# Patient Record
Sex: FEMALE | Race: WHITE | Employment: PART TIME | URBAN - METROPOLITAN AREA
[De-identification: names, ages, dates, MRNs, and addresses within clinical notes are randomized per-mention and may not be internally consistent; named-entity substitution may affect disease eponyms.]

---

## 2017-12-29 ENCOUNTER — HOSPITAL ENCOUNTER (EMERGENCY)
Age: 52
Discharge: LEFT BEFORE DISCHARGE | End: 2017-12-29
Attending: EMERGENCY MEDICINE
Payer: SUBSIDIZED

## 2017-12-29 VITALS
RESPIRATION RATE: 19 BRPM | SYSTOLIC BLOOD PRESSURE: 147 MMHG | HEART RATE: 88 BPM | TEMPERATURE: 98.3 F | OXYGEN SATURATION: 99 % | DIASTOLIC BLOOD PRESSURE: 91 MMHG

## 2017-12-29 PROCEDURE — 75810000275 HC EMERGENCY DEPT VISIT NO LEVEL OF CARE

## 2017-12-30 NOTE — ED NOTES
Pt advised staff member that she declines all services here and left the ED on her own. Elpidio Purdy NP advised.

## 2017-12-30 NOTE — ED TRIAGE NOTES
Patient states that she is concerned for her anonymity and does not want anybody to release her name or reason for visit to the media. Advised patient of her right to privacy. Patient demonstrates pressured speech and has difficulty answering questions as to why she came to ED this evening.

## 2020-03-17 ENCOUNTER — TELEPHONE (OUTPATIENT)
Dept: HEMATOLOGY | Age: 55
End: 2020-03-17

## 2020-04-21 ENCOUNTER — VIRTUAL VISIT (OUTPATIENT)
Dept: HEMATOLOGY | Age: 55
End: 2020-04-21

## 2020-04-21 DIAGNOSIS — B18.2 CHRONIC HEPATITIS C WITHOUT HEPATIC COMA (HCC): Primary | ICD-10-CM

## 2020-04-21 PROBLEM — J30.89 ENVIRONMENTAL AND SEASONAL ALLERGIES: Status: ACTIVE | Noted: 2020-04-21

## 2020-04-21 NOTE — PROGRESS NOTES
[address] [place] [address], Aracelis TRIVEDI Dian Bar, MD Nile Orn, PA-C Bridgett Ruffing [facility]     April S Billie, [facility]   Quinton Black RADHA-ALEX Hdz [facility]       Danni Maldonado [facility] 136    at [address], [facility] Edita Larry  22.    [phone]    FAX: [address] [place] [address], [address]    [phone]    FAX: [phone]     Patient Care Team:  Jade Stephens MD as PCP - General (Internal Medicine)    Patient Active Problem List   Diagnosis Code    [facility] ([facility] B18.2    Environmental and seasonal allergies J30.89     VIRTUAL TELEHEALTH VISIT PERFORMED DUE TO COVID-19 EPIDEMIC    CONSENT:  Jordan Taylor, who was seen by synchronous, real-time, audio-video technology, and/or her healthcare decision maker, is aware this patient-initiated, TeleHealth encounter on 4/21/2020 is a billable service, with coverage as determined by her insurance carrier. she is aware she may receive a bill and has provided verbal consent to proceed. This patient was evaluated during a Virtual TeleHealth visit. A caregiver was present if appropriate. Due to this being a TeleHealth encounter performed during the DNOQ-44 public health emergency, the physical examination was limited to that listed in the [address] [facility].    The clinician listed above had asked me to see Jordan Taylor in consultation regarding chronic HCV and its management. No medical records were available for review when the patient was here for the appointment. The patient is a 54 y.o.   female who was found to have abnormalities in liver chemistries and subsequently tested positive for chronic HCV remotely. Risk factors for acquiring HCV are IV drug use and tattoos remotely. There was no history of acute icteric hepatitis at the time of these risk factors. Imaging of the liver was either not performed or the results are not available to me. An assessment of liver fibrosis with biopsy or elastography has not been performed. The patient was treated with standard interferon and then peginterferon with ribavirin. The patient was treated for 6 weeks. The patient tolerated treatment poorly and had to prematurely discontinue therapy. HCV RNA levels obtained during treatment are not available at this time. The patient is unaware of the virologic response pattern. The patient has no symptoms which can be attributed to the liver disorder. The patient has not experienced the following symptoms: pain in the right side over the liver, yellowing of the eyes or skin or swelling of the abdomen. The patient completes all daily activities without any functional limitations. ASSESSMENT AND PLAN:  Chronic HCV   Chronic HCV of unclear severity. Liver transaminases are normal. ALP is normal. Liver function is normal. Total bilirubin is normal. Serum albumin is normal. The platelet count is normal.     Based upon laboratory studies, the patient does not appear to have advanced liver disease. Will perform laboratory testing to monitor liver function and degree of liver injury. This included INR. Will perform and/or review results of HCV viral load, HCV genotype to define the specific treatment and duration of treatment that will be required. Will perform serologic and virologic studies to assess for other causes of chronic liver disease. Will perform imaging of the liver with ultrasound. No non-urgent imaging is being performed at this time. She has no elevation in her liver enzymes, so I will not hold up treatment to wait on imaging.  I can order it later if needed. The degree of fibrosis will be assessed by FibroSure. Chronic HCV treatment  The patient was previously treated for HCV with peginterferon and ribavirin. The previous treatment response was possible partial virologic response. The patient has HCV genotype that is not yet defined. Discussed the treatment alternatives. The SVR/cure rate for HCV now exceeds 97% without significant side effects for most patients with HCV. The specific treatment is dependent upon genotype, viral load and histology. The patient should be treated with Jactobylene Batter, as it is preferred by Medicaid. I will mail the 2nd page of the treatment agreement to her. As soon as I receive that back, I will order the medication. The SVR/cure rate for is over 95%. Screening for hepatocellular carcinoma  HCC screening is not necessary if the patient has no evidence of cirrhosis. Treatment of other medical problems in patients with chronic liver disease  There are no contraindications for the patient to take most medications necessary for treatment of other medical issues. The patient can take any medications utilized for treatment of DM and/or statins to treat hypercholesterolemia. The patient does not consume alcohol on a daily basis. Normal doses of acetaminophen, as recommended on the label of the bottle, are not hepatotoxic except in the setting of daily alcohol use. Even patients with cirrhosis can utilize acetaminophen for pain. Counseling for alcohol in patients with chronic liver disease  The patient was counseled regarding alcohol consumption and the effect of alcohol on chronic liver disease. The patient does not consume any significant amount of alcohol. Drug use  The patient was counseled regarding the risk of overdose and death from using narcotic drugs and the risk of becoming reinfected with HCV once they are cured if they resume narcotic drug use.       The patient has not used drugs in over a decade. Vaccinations   The need for vaccination against viral hepatitis A and B will be assessed with serologic and instituted as appropriate. Routine vaccinations against other bacterial and viral agents can be performed as indicated. Annual flu vaccination should be administered if indicated. ALLERGIES  No Known Allergies    No current outpatient medications on file prior to visit. No current facility-administered medications on file prior to visit. SYSTEM REVIEW NOT RELATED TO LIVER DISEASE OR REVIEWED ABOVE:  Constitution systems: Negative for fever, chills, weight gain, weight loss. Eyes: Negative for visual changes. ENT: Negative for sore throat, painful swallowing. Respiratory: Negative for cough, hemoptysis, SOB. Cardiology: Negative for chest pain, palpitations. GI:  Negative for constipation or diarrhea. : Negative for urinary frequency, dysuria, hematuria, nocturia. Skin: Negative for rash. Hematology: Negative for easy bruising, blood clots. Musculo-skeletal: Negative for back pain, muscle pain, weakness. Neurologic: Negative for headaches, dizziness, vertigo, memory problems not related to HE. Psychology: Negative for anxiety, depression. FAMILY HISTORY:  The father has COPD, addiction history and CAD. The mother passed from myelodysplastic leukemia (acquired). There is no family history of liver disease. There is no family history of immune disorders. SOCIAL HISTORY:  The patient is . The patient has 3 children and 5 grandchildren. The patient smokes 1 PPD. The patient does not drink alcohol. The patient currently works full time with [employer]. PHYSICAL EXAMINATION:  There were no vitals taken for this visit. General: No acute distress. Eyes: Sclera anicteric. ENT: No oral lesions. Nodes: No adenopathy. Skin: No spider angiomata. No jaundice. No palmar erythema.   Respiratory: No wheezing, respiratory distress, cyanosis. Cardiovascular: No JVD. Abdomen: Appears soft with no obvious ascites. Extremities: No edema. No muscle wasting. No gross arthritic changes. Neurologic: Alert and oriented. Cranial nerves grossly intact. No asterixis. LABORATORY STUDIES:  From 3/2020  AST/ALT/ALP/T Bili/ALB: 36/29/80/0.4/4.5/4.5  WBC/HB/PLT/INR: 8.1/14.3/165  BUN/CREAT: 12/0.86    SEROLOGIES:  3/2020 HCV RNA 7,440,000    LIVER HISTOLOGY:  Not available or performed    ENDOSCOPIC PROCEDURES:  Not available or performed    RADIOLOGY:  Not available or performed    OTHER TESTING:  Not available or performed    FOLLOW-UP:  All of the issues listed above in the assessment and plan were discussed with the patient. All questions were answered. The patient expressed a clear understanding of the above. Pursuant to the emergency declaration under the [address] [place] [address] wa[facility] authority and the [employer] Act, this Virtual Visit was conducted, with the patient's (and/or their legal guardian's) consent, to reduce the patient's risk of exposure to COVID-19 and provide necessary medical care. Services were provided through a video synchronous discussion virtually to substitute for an in-person clinic visit. The patient was located in her home. The provider [facility] the [facility] office. Because of the COVID-19 epidemic, all non-emergent diagnostic testing will be delayed for 3-4 months to reduce the risk of patient exposure to and potential infection from the novel corona virus. Orders to obtain laboratory testing will be mailed to the patient, along with the treatment agreement. [address] [place] [address] 4 weeks after initiation of medical therapy; this will be done either virtually or in person depending on the state of affairs of COVID-19.  The patient was advised to call the office when she receives her medication to provide us with her start date and to schedule her 4 week treatment follow up appointment.       Tomas Salinas, [facility]-BC  Liver [place] [facility] [address] [place], [address] Edita Larry  22.  [phone]

## 2020-04-30 LAB
HAV AB SER QL IA: NEGATIVE
HBV CORE AB SERPL QL IA: NEGATIVE
HBV SURFACE AB SER QL: NON REACTIVE
HBV SURFACE AG SERPL QL IA: NEGATIVE
INR PPP: 0.9 (ref 0.8–1.2)
PROTHROMBIN TIME: 10.1 SEC (ref 9.1–12)

## 2020-05-01 LAB
A2 MACROGLOB SERPL-MCNC: 423 MG/DL (ref 110–276)
ALT SERPL W P-5'-P-CCNC: 26 IU/L (ref 0–40)
APO A-I SERPL-MCNC: 114 MG/DL (ref 116–209)
BILIRUB SERPL-MCNC: 0.2 MG/DL (ref 0–1.2)
COMMENT: ABNORMAL
FIBROSIS SCORING:, 550107: ABNORMAL
FIBROSIS STAGE SERPL QL: ABNORMAL
GGT SERPL-CCNC: 59 IU/L (ref 0–60)
HAPTOGLOB SERPL-MCNC: 145 MG/DL (ref 33–346)
INTERPRETATION, 550106: ABNORMAL
LIVER FIBR SCORE SERPL CALC.FIBROSURE: 0.49 (ref 0–0.21)
NECROINFLAMM ACTIVITY SCORING:, 550121: ABNORMAL
NECROINFLAMMATORY ACT GRADE SERPL QL: ABNORMAL
NECROINFLAMMATORY ACT SCORE SERPL: 0.15 (ref 0–0.17)
SERVICE CMNT-IMP: ABNORMAL

## 2020-05-05 RX ORDER — GLECAPREVIR AND PIBRENTASVIR 40; 100 MG/1; MG/1
3 TABLET, FILM COATED ORAL DAILY
Qty: 84 TAB | Refills: 1 | Status: SHIPPED | OUTPATIENT
Start: 2020-05-05 | End: 2020-10-12 | Stop reason: ALTCHOICE

## 2020-05-06 ENCOUNTER — DOCUMENTATION ONLY (OUTPATIENT)
Dept: HEMATOLOGY | Age: 55
End: 2020-05-06

## 2020-05-09 LAB
HCV GENTYP SERPL NAA+PROBE: NORMAL
PLEASE NOTE, 550474: NORMAL

## 2020-05-26 ENCOUNTER — DOCUMENTATION ONLY (OUTPATIENT)
Dept: HEMATOLOGY | Age: 55
End: 2020-05-26

## 2020-05-28 NOTE — PROGRESS NOTES
Called pt back. Recommended MiraLax up to TID until BM. Then can titrate to effect. Call back with any questions.

## 2020-06-15 ENCOUNTER — OFFICE VISIT (OUTPATIENT)
Dept: HEMATOLOGY | Age: 55
End: 2020-06-15

## 2020-06-15 VITALS
OXYGEN SATURATION: 99 % | HEART RATE: 63 BPM | TEMPERATURE: 97.5 F | WEIGHT: 172 LBS | DIASTOLIC BLOOD PRESSURE: 82 MMHG | RESPIRATION RATE: 16 BRPM | BODY MASS INDEX: 31.65 KG/M2 | HEIGHT: 62 IN | SYSTOLIC BLOOD PRESSURE: 133 MMHG

## 2020-06-15 DIAGNOSIS — B18.2 CHRONIC HEPATITIS C WITHOUT HEPATIC COMA (HCC): Primary | ICD-10-CM

## 2020-06-15 NOTE — PROGRESS NOTES
[address] [facility], MD, [address], [place] Winifred Zavala, MD Roby Hicks PA-C Bridgett Ruffing, [facility]-BC     April S Billie, [facility]   HECTOR Montez-ALEX Hdz, [facility]       Danni Maldonado [facility] 136    at [address], [address] [facility], [facility] 22.    [phone]    FAX: [address] [place] [address], [address]    [phone]    FAX: [phone]     Patient Care Team:  Jade Stephens MD as PCP - General (Internal Medicine)    Patient Active Problem List   Diagnosis Code    Chronic hepatitis C without hepatic coma ([facility] B18.2    Environmental and seasonal allergies J30.89     Jordan Taylor returns to the [address] for management of chronic HCV. The active problem list, all pertinent past medical history, medications, liver histology and laboratory findings related to the liver disorder were reviewed with the patient. The patient is a 54 y.o.  female who was found to have abnormalities in liver chemistries and subsequently tested positive for chronic HCV remotely. Imaging of the liver was either not performed or the results are not available to me. An assessment of liver fibrosis with HCV FibroSure suggested F2 fibrosis. The patient was treated with standard interferon and then peginterferon with ribavirin. The patient was treated for 6 weeks. The patient tolerated treatment poorly and had to prematurely discontinue therapy. She is now on 8 weeks of [address]. The patient has no symptoms which can be attributed to the liver disorder.     The patient has not experienced the following symptoms: pain in the right side over the liver, yellowing of the eyes or skin or swelling of the abdomen. The patient completes all daily activities without any functional limitations. ASSESSMENT AND PLAN:  Chronic HCV   Chronic HCV with septal fibrosis. Liver transaminases are normal. ALP is normal. Liver function is normal. Total bilirubin is normal. Serum albumin is normal. The platelet count is normal.     Will perform laboratory testing to monitor liver function and degree of liver injury. This included BMP, CBC and hepatic panel. Chronic HCV treatment  The patient was previously treated for HCV with peginterferon and ribavirin. The previous treatment response was possible partial virologic response. She is currently on 8 weeks of treatment with Mavyret. She is tolerating it well and has not missed any doses. Constipation  She had some issues with constipation after starting the medication but we had discussed MiraLax between visits. This has worked for her. Easy bruising  She has noticed easier bruising and heavier periods. I am checking CBC so will assess when they result. I do not think this is secondary to the medication. Screening for hepatocellular carcinoma  HCC screening is not necessary if the patient has no evidence of cirrhosis. Treatment of other medical problems in patients with chronic liver disease  There are no contraindications for the patient to take most medications necessary for treatment of other medical issues. The patient can take any medications utilized for treatment of DM and/or statins to treat hypercholesterolemia. The patient does not consume alcohol on a daily basis. Normal doses of acetaminophen, as recommended on the label of the bottle, are not hepatotoxic except in the setting of daily alcohol use. Even patients with cirrhosis can utilize acetaminophen for pain.     Counseling for alcohol in patients with chronic liver disease  The patient was counseled regarding alcohol consumption and the effect of alcohol on chronic liver disease. The patient does not consume any significant amount of alcohol. Vaccinations   Recommend vaccination against viral hepatitis A and B, as there is no documented immunity. Routine vaccinations against other bacterial and viral agents can be performed as indicated. Annual flu vaccination should be administered if indicated. ALLERGIES  No Known Allergies    Current Outpatient Medications on File Prior to Visit   Medication Sig Dispense Refill    glecaprevir-pibrentasvir (Mavyret) 100-40 mg tab Take 3 Tabs by mouth daily. Indications: chronic HCV 84 Tab 1     No current facility-administered medications on file prior to visit. SYSTEM REVIEW NOT RELATED TO LIVER DISEASE OR REVIEWED ABOVE:  Constitution systems: Negative for fever, chills, weight gain, weight loss. Eyes: Negative for visual changes. ENT: Negative for sore throat, painful swallowing. Respiratory: Negative for cough, hemoptysis, SOB. Cardiology: Negative for chest pain, palpitations. GI:  Negative for constipation or diarrhea. : Negative for urinary frequency, dysuria, hematuria, nocturia. Skin: Negative for rash. Hematology: Now having heavy bleeding with menses, some easy bruising. Negative for blood clots. Musculo-skeletal: Negative for back pain, muscle pain, weakness. Neurologic: Negative for headaches, dizziness, vertigo, memory problems not related to HE. Psychology: Negative for anxiety, depression. FAMILY HISTORY:  The father has COPD, addiction history and CAD. The mother passed from myelodysplastic leukemia (acquired). There is no family history of liver disease. There is no family history of immune disorders. SOCIAL HISTORY:  The patient is . The patient has 3 children and 5 grandchildren. The patient smokes 1 PPD. The patient does not drink alcohol. The patient currently works full time with [employer].      PHYSICAL EXAMINATION:  Visit Vitals  BP 133/82 (BP 1 Location: Left arm, BP Patient Position: Sitting)   Pulse 63   Temp 97.5 °F (36.4 °C) (Temporal)   Resp 16   Ht 5' 2\" (1.575 m)   Wt 172 lb (78 kg)   SpO2 99%   BMI 31.46 kg/m²     PHYSICAL EXAMINATION:  VS: per nursing note  General: No acute distress. Eyes: Sclera anicteric. ENT: No oral lesions. Nodes: No adenopathy. Skin: No spider angiomata. No jaundice. No palmar erythema. Respiratory: Lungs clear to auscultation. Cardiovascular: Regular heart rate. No murmurs. No JVD. Abdomen: Soft non-tender, liver size normal to percussion/palpation. Spleen not palpable. No obvious ascites. Extremities: No edema. No muscle wasting. No gross arthritic changes. Neurologic: Alert and oriented. Cranial nerves grossly intact. No asterixis. LABORATORY STUDIES:  Liver [place] of [address] & Units 4/29/2020   INR 0.8 - 1.2 0.9   ALT 0 - 40 IU/L 26   Bili, Total 0.0 - 1.2 mg/dL 0.2     From 3/2020  AST/ALT/ALP/T Bili/ALB: 36/29/80/0.4/4.5/4.5  WBC/HB/PLT/INR: 8.1/14.3/165  BUN/CREAT: 12/0.86    SEROLOGIES:  Serologies Latest Ref Rng & Units 4/29/2020   Hep A Ab, Total Negative Negative   Hep B Surface Ag Negative Negative   Hep B Core Ab, Total Negative Negative   Hep B Surface AB QL  Non Reactive   Hep C Genotype  1a     3/2020 HCV RNA 7,440,000    LIVER HISTOLOGY:  4/2020. HCV FibroSure. F2.     ENDOSCOPIC PROCEDURES:  Not available or performed    RADIOLOGY:  Not available or performed    OTHER TESTING:  Not available or performed    FOLLOW-UP:  All of the issues listed above in the assessment and plan were discussed with the patient. All questions were answered. The patient expressed a clear understanding of the above. Follow up at the [place] [address] in 4 months for SVR check.      Jeffrey Granger [facility]-BC  Liver [place] [facility] [address] [place], [address] Edita Larry  22. [phone]

## 2020-06-15 NOTE — PROGRESS NOTES
Chief Complaint   Patient presents with    Follow-up     HCV follow up     Visit Vitals  /82 (BP 1 Location: Left arm, BP Patient Position: Sitting)   Pulse 63   Temp 97.5 °F (36.4 °C) (Temporal)   Resp 16   Ht 5' 2\" (1.575 m)   Wt 172 lb (78 kg)   SpO2 99%   BMI 31.46 kg/m²     3 most recent PHQ Screens 6/15/2020   Little interest or pleasure in doing things Not at all   Feeling down, depressed, irritable, or hopeless Not at all   Total Score PHQ 2 0     Abuse Screening Questionnaire 6/15/2020   Do you ever feel afraid of your partner? N   Are you in a relationship with someone who physically or mentally threatens you? N   Is it safe for you to go home?  Ana Maria Sanchez

## 2020-10-12 ENCOUNTER — OFFICE VISIT (OUTPATIENT)
Dept: HEMATOLOGY | Age: 55
End: 2020-10-12
Payer: COMMERCIAL

## 2020-10-12 VITALS
BODY MASS INDEX: 30.91 KG/M2 | RESPIRATION RATE: 16 BRPM | TEMPERATURE: 97.6 F | HEIGHT: 62 IN | DIASTOLIC BLOOD PRESSURE: 88 MMHG | SYSTOLIC BLOOD PRESSURE: 123 MMHG | WEIGHT: 168 LBS | HEART RATE: 74 BPM | OXYGEN SATURATION: 96 %

## 2020-10-12 DIAGNOSIS — B18.2 CHRONIC HEPATITIS C WITHOUT HEPATIC COMA (HCC): Primary | ICD-10-CM

## 2020-10-12 PROBLEM — F51.5 NIGHTMARE: Status: ACTIVE | Noted: 2020-10-12

## 2020-10-12 PROBLEM — F19.11 HISTORY OF DRUG ABUSE IN REMISSION (HCC): Status: ACTIVE | Noted: 2020-10-12

## 2020-10-12 PROBLEM — I10 ESSENTIAL HYPERTENSION: Status: ACTIVE | Noted: 2020-10-12

## 2020-10-12 PROCEDURE — 99213 OFFICE O/P EST LOW 20 MIN: CPT | Performed by: NURSE PRACTITIONER

## 2020-10-12 RX ORDER — BUPROPION HYDROCHLORIDE 300 MG/1
300 TABLET ORAL
COMMUNITY

## 2020-10-12 RX ORDER — PRAZOSIN HYDROCHLORIDE 2 MG/1
CAPSULE ORAL
COMMUNITY

## 2020-10-12 RX ORDER — LISINOPRIL AND HYDROCHLOROTHIAZIDE 10; 12.5 MG/1; MG/1
TABLET ORAL DAILY
COMMUNITY
End: 2022-06-29

## 2020-10-12 NOTE — PROGRESS NOTES
[address] [facility], [place] TRIVEDI Kriste Blas, MD Harless Hertz, PA-C Clance Como, ACN-BC     Rose Mary Wise, [facility]   HECTOR Campbell-ALEX Almeida, [facility]       Danni Maldonado [facility] [address] 136    at [address], [address] [place] [address], [facility] 22.    [phone]    FAX: [address] [place] [address], [address]    [phone]    FAX: [phone]     Patient Care Team:  Guadalupe Otero MD as PCP - General (Internal Medicine)    Patient Active Problem List   Diagnosis Code    Chronic hepatitis C without hepatic coma ([facility] B18.2    Environmental and seasonal allergies J30.89    Essential hypertension I10    Nightmare F51.5    History of drug abuse in remission ([facility] F19.11     [facility]ata Gonzalez [facility] the [facility] for management of chronic HCV. The active problem list, all pertinent past medical history, medications, liver histology and laboratory findings related to the liver disorder were reviewed with the patient. The patient is a 54 y.o.  female who was found to have abnormalities in liver chemistries and subsequently tested positive for chronic HCV remotely. Imaging of the liver was either not performed or the results are not available to me. An assessment of liver fibrosis with HCV FibroSure suggested F2 fibrosis. The patient was treated with standard interferon and then peginterferon with ribavirin. The patient was treated for 6 weeks. The patient tolerated treatment poorly and had to prematurely discontinue therapy. She completed 8 weeks of Mavyret in mid June 2020. This is her SVR visit.      The patient has no symptoms which can be attributed to the liver disorder. The patient has not experienced the following symptoms: pain in the right side over the liver, yellowing of the eyes or skin or swelling of the abdomen. The patient completes all daily activities without any functional limitations. ASSESSMENT AND PLAN:  Chronic HCV   Chronic HCV with septal fibrosis. She did not get labs completed after the last visit. The phlebotomist had issues getting her blood and then she forgot to go to her normal lab, which has better success. Liver transaminases are normal. ALP is normal. Liver function is normal. Total bilirubin is normal. Serum albumin is normal. The platelet count is normal.     Will perform laboratory testing to monitor liver function and degree of liver injury. This included BMP, CBC and hepatic panel. Please call pt or text her results. I advised I may not be able to text results. Chronic HCV treatment  The patient was previously treated for HCV with peginterferon and ribavirin. The previous treatment response was possible partial virologic response. She has completed 8 weeks of Mavyret mid June 2020. This is her SVR visit. Constipation  She had some issues with constipation after starting the medication but this has resolved. Screening for hepatocellular carcinoma  HCC screening is not necessary if the patient has no evidence of cirrhosis. Treatment of other medical problems in patients with chronic liver disease  There are no contraindications for the patient to take most medications necessary for treatment of other medical issues. The patient can take any medications utilized for treatment of DM and/or statins to treat hypercholesterolemia. The patient does not consume alcohol on a daily basis. Normal doses of acetaminophen, as recommended on the label of the bottle, are not hepatotoxic except in the setting of daily alcohol use.  Even patients with cirrhosis can utilize acetaminophen for pain.    Counseling for alcohol in patients with chronic liver disease  The patient was counseled regarding alcohol consumption and the effect of alcohol on chronic liver disease. The patient does not consume any significant amount of alcohol. Vaccinations   Recommend vaccination against viral hepatitis A and B, as there is no documented immunity. Routine vaccinations against other bacterial and viral agents can be performed as indicated. Annual flu vaccination should be administered if indicated. ALLERGIES  No Known Allergies    Current Outpatient Medications on File Prior to Visit   Medication Sig Dispense Refill    lisinopril-hydroCHLOROthiazide (PRINZIDE, ZESTORETIC) 10-12.5 mg per tablet Take  by mouth daily.  buPROPion XL (WELLBUTRIN XL) 300 mg XL tablet Take 300 mg by mouth every morning.  prazosin (MINIPRESS) 2 mg capsule 1 capsule at bedtime      [DISCONTINUED] glecaprevir-pibrentasvir (Mavyret) 100-40 mg tab Take 3 Tabs by mouth daily. Indications: chronic HCV 84 Tab 1     No current facility-administered medications on file prior to visit. SYSTEM REVIEW NOT RELATED TO LIVER DISEASE OR REVIEWED ABOVE:  Constitution systems: Negative for fever, chills, weight gain, weight loss. Eyes: Negative for visual changes. ENT: Negative for sore throat, painful swallowing. Respiratory: Negative for cough, hemoptysis, SOB. Cardiology: Negative for chest pain, palpitations. GI:  Negative for constipation or diarrhea. : Negative for urinary frequency, dysuria, hematuria, nocturia. Skin: Negative for rash. Hematology: Now having heavy bleeding with menses, some easy bruising. Negative for blood clots. Musculo-skeletal: Negative for back pain, muscle pain, weakness. Neurologic: Negative for headaches, dizziness, vertigo, memory problems not related to HE. Psychology: Negative for anxiety, depression. FAMILY HISTORY:  The father has COPD, addiction history and CAD.    The mother passed from myelodysplastic leukemia (acquired). There is no family history of liver disease. There is no family history of immune disorders. SOCIAL HISTORY:  The patient is . The patient has 3 children and 5 grandchildren. The patient smokes 1 PPD. The patient does not drink alcohol. The patient currently works full time with [employer]. PHYSICAL EXAMINATION:  Visit Vitals  /88 (BP 1 Location: Left arm, BP Patient Position: Sitting)   Pulse 74   Temp 97.6 °F (36.4 °C) (Temporal)   Resp 16   Ht 5' 2\" (1.575 m)   Wt 168 lb (76.2 kg)   SpO2 96%   BMI 30.73 kg/m²       PHYSICAL EXAMINATION:  VS: per nursing note  General: No acute distress. Eyes: Sclera anicteric. ENT: No oral lesions. Nodes: No adenopathy. Skin: No spider angiomata. No jaundice. No palmar erythema. Respiratory: Lungs clear to auscultation. Cardiovascular: Regular heart rate. No murmurs. No JVD. Abdomen: Soft non-tender, liver size normal to percussion/palpation. Spleen not palpable. No obvious ascites. Extremities: No edema. No muscle wasting. No gross arthritic changes. Neurologic: Alert and oriented. Cranial nerves grossly intact. No asterixis. LABORATORY STUDIES:  Liver [place] of [address] [place] [address] & Units 4/29/2020   INR 0.8 - 1.2 0.9   ALT 0 - 40 IU/L 26   Bili, Total 0.0 - 1.2 mg/dL 0.2     From 3/2020  AST/ALT/ALP/T Bili/ALB: 36/29/80/0.4/4.5/4.5  WBC/HB/PLT/INR: 8.1/14.3/165  BUN/CREAT: 12/0.86    SEROLOGIES:  Serologies Latest Ref Rng & Units 4/29/2020   Hep A Ab, Total Negative Negative   Hep B Surface Ag Negative Negative   Hep B Core Ab, Total Negative Negative   Hep B Surface AB QL  Non Reactive   Hep C Genotype  1a     3/2020 HCV RNA 7,440,000    LIVER HISTOLOGY:  4/2020. HCV FibroSure.  F2.     ENDOSCOPIC PROCEDURES:  Not available or performed    RADIOLOGY:  Not available or performed    OTHER TESTING:  Not available or performed    FOLLOW-UP:  All of the issues listed above in the assessment and plan were discussed with the patient. All questions were answered. The patient expressed a clear understanding of the above. Follow up at the [place] [address] in 6 months for reassessment. Will repeat FibroSure 1 year status post completion of treatment.      JACKY Tavares-BC  Liver [place] [facility] [address] [place], [address] [address]ita Larry  22.  [phone]

## 2020-10-12 NOTE — PROGRESS NOTES
Identified pt with two pt identifiers(name and ). Reviewed record in preparation for visit and have obtained necessary documentation. Chief Complaint   Patient presents with    Hepatitis C     f/u      Vitals:    10/12/20 0756   BP: (!) 146/84   Pulse: 74   Resp: 16   Temp: 97.6 °F (36.4 °C)   TempSrc: Temporal   SpO2: 96%   Weight: 168 lb (76.2 kg)   Height: 5' 2\" (1.575 m)   PainSc:   0 - No pain       Health Maintenance Review: Patient reminded of \"due or due soon\" health maintenance. I have asked the patient to contact his/her primary care provider (PCP) for follow-up on his/her health maintenance. Coordination of Care Questionnaire:  :   1) Have you been to an emergency room, urgent care, or hospitalized since your last visit? If yes, where when, and reason for visit? no       2. Have seen or consulted any other health care provider since your last visit? If yes, where when, and reason for visit? NO      Patient is accompanied by self I have received verbal consent from Melissa Sevilla to discuss any/all medical information while they are present in the room.

## 2020-10-13 LAB
ALBUMIN SERPL-MCNC: 4.4 G/DL (ref 3.8–4.9)
ALP SERPL-CCNC: 86 IU/L (ref 39–117)
ALT SERPL-CCNC: 57 IU/L (ref 0–32)
AST SERPL-CCNC: 69 IU/L (ref 0–40)
BASOPHILS # BLD AUTO: 0.1 X10E3/UL (ref 0–0.2)
BASOPHILS NFR BLD AUTO: 1 %
BILIRUB DIRECT SERPL-MCNC: 0.13 MG/DL (ref 0–0.4)
BILIRUB SERPL-MCNC: 0.5 MG/DL (ref 0–1.2)
BUN SERPL-MCNC: 13 MG/DL (ref 6–24)
BUN/CREAT SERPL: 15 (ref 9–23)
CALCIUM SERPL-MCNC: 9.8 MG/DL (ref 8.7–10.2)
CHLORIDE SERPL-SCNC: 107 MMOL/L (ref 96–106)
CO2 SERPL-SCNC: 19 MMOL/L (ref 20–29)
CREAT SERPL-MCNC: 0.86 MG/DL (ref 0.57–1)
EOSINOPHIL # BLD AUTO: 0.4 X10E3/UL (ref 0–0.4)
EOSINOPHIL NFR BLD AUTO: 5 %
ERYTHROCYTE [DISTWIDTH] IN BLOOD BY AUTOMATED COUNT: 12.5 % (ref 11.7–15.4)
GLUCOSE SERPL-MCNC: 90 MG/DL (ref 65–99)
HCT VFR BLD AUTO: 43.7 % (ref 34–46.6)
HGB BLD-MCNC: 14.8 G/DL (ref 11.1–15.9)
IMM GRANULOCYTES # BLD AUTO: 0 X10E3/UL (ref 0–0.1)
IMM GRANULOCYTES NFR BLD AUTO: 1 %
LYMPHOCYTES # BLD AUTO: 3.4 X10E3/UL (ref 0.7–3.1)
LYMPHOCYTES NFR BLD AUTO: 42 %
MCH RBC QN AUTO: 30.1 PG (ref 26.6–33)
MCHC RBC AUTO-ENTMCNC: 33.9 G/DL (ref 31.5–35.7)
MCV RBC AUTO: 89 FL (ref 79–97)
MONOCYTES # BLD AUTO: 0.8 X10E3/UL (ref 0.1–0.9)
MONOCYTES NFR BLD AUTO: 10 %
NEUTROPHILS # BLD AUTO: 3.4 X10E3/UL (ref 1.4–7)
NEUTROPHILS NFR BLD AUTO: 41 %
PLATELET # BLD AUTO: 185 X10E3/UL (ref 150–450)
POTASSIUM SERPL-SCNC: 4.4 MMOL/L (ref 3.5–5.2)
PROT SERPL-MCNC: 7.1 G/DL (ref 6–8.5)
RBC # BLD AUTO: 4.91 X10E6/UL (ref 3.77–5.28)
SODIUM SERPL-SCNC: 140 MMOL/L (ref 134–144)
WBC # BLD AUTO: 8.1 X10E3/UL (ref 3.4–10.8)

## 2020-10-19 LAB
HCV RNA SERPL NAA+PROBE-ACNC: NORMAL IU/ML
HCV RNA SERPL NAA+PROBE-LOG IU: 5.95 LOG10 IU/ML
HCV RNA SERPL QL NAA+PROBE: POSITIVE
TEST INFORMATION: NORMAL

## 2020-10-29 RX ORDER — SOFOSBUVIR, VELPATASVIR, AND VOXILAPREVIR 400; 100; 100 MG/1; MG/1; MG/1
1 TABLET, FILM COATED ORAL DAILY
Qty: 28 TAB | Refills: 2 | Status: SHIPPED | OUTPATIENT
Start: 2020-10-29 | End: 2021-04-12 | Stop reason: ALTCHOICE

## 2020-11-18 ENCOUNTER — TELEPHONE (OUTPATIENT)
Dept: HEMATOLOGY | Age: 55
End: 2020-11-18

## 2020-11-18 NOTE — TELEPHONE ENCOUNTER
Called patient to schedule F/U virtual visit appointment the week of 12/1/2020 per Jakob Mendoza NP. And to notify her that labs will be ordered at that time. No answer. LVM with details and to call the office back to schedule the appointment.

## 2021-01-05 ENCOUNTER — OFFICE VISIT (OUTPATIENT)
Dept: HEMATOLOGY | Age: 56
End: 2021-01-05
Payer: COMMERCIAL

## 2021-01-05 VITALS — BODY MASS INDEX: 30.69 KG/M2 | TEMPERATURE: 97.2 F | WEIGHT: 166.8 LBS | HEIGHT: 62 IN

## 2021-01-05 DIAGNOSIS — B18.2 CHRONIC HEPATITIS C WITHOUT HEPATIC COMA (HCC): Primary | ICD-10-CM

## 2021-01-05 PROCEDURE — 99213 OFFICE O/P EST LOW 20 MIN: CPT | Performed by: NURSE PRACTITIONER

## 2021-01-05 NOTE — PROGRESS NOTES
Room 3    Identified pt with two pt identifiers(name and ). Reviewed record in preparation for visit and have obtained necessary documentation. All patient medications has been reviewed. Chief Complaint   Patient presents with    Labs       3 most recent [facility] 36 Screens 2021   Little interest or pleasure in doing things Not at all   Feeling down, depressed, irritable, or hopeless Not at all   Total Score PHQ 2 0     Abuse Screening Questionnaire 6/15/2020   Do you ever feel afraid of your partner? N   Are you in a relationship with someone who physically or mentally threatens you? N   Is it safe for you to go home? Y       Health Maintenance Due   Topic    Pneumococcal 0-64 years (1 of 1 - PPSV23)    DTaP/Tdap/Td series (1 - Tdap)    PAP AKA CERVICAL CYTOLOGY     Lipid Screen     Shingrix Vaccine Age 49> (1 of 2)    Colorectal Cancer Screening Combo     Breast Cancer Screen Mammogram     Flu Vaccine (1)     Health Maintenance Review: Patient reminded of \"due or due soon\" health maintenance. I have asked the patient to contact his/her primary care provider (PCP) for follow-up on his/her health maintenance. Vitals:    21 1515   BP: (P) 125/81   Pulse: (P) 85   Temp: 97.2 °F (36.2 °C)   TempSrc: Temporal   SpO2: (P) 99%       Wt Readings from Last 3 Encounters:   10/12/20 168 lb (76.2 kg)   06/15/20 172 lb (78 kg)     Temp Readings from Last 3 Encounters:   21 97.2 °F (36.2 °C) (Temporal)   10/12/20 97.6 °F (36.4 °C) (Temporal)   06/15/20 97.5 °F (36.4 °C) (Temporal)     BP Readings from Last 3 Encounters:   21 (P) 125/81   10/12/20 123/88   06/15/20 133/82     Pulse Readings from Last 3 Encounters:   21 (P) 85   10/12/20 74   06/15/20 63       Coordination of Care Questionnaire:   1) Have you been to an emergency room, urgent care, or hospitalized since your last visit? No    2. Have seen or consulted any other health care provider since your last visit?   No

## 2021-01-05 NOTE — PROGRESS NOTES
[address] [facility], MD, Lorrayne Closs, Chipper Battle, MD Malen Linker, PA-C Kiki Deforest, [facility]-BC     Rose Mary Wise, [facility]   Shannan Santamaria NATAN Hoffman [facility]       Danni Maldonado [facility] [address]    at [address], [address] [place] [address], [facility] 22.    [phone]    FAX: [address] [place] [address], [address]    [phone]    FAX: [phone]     Patient Care Team:  Rebbeca Dubin, MD as PCP - General (Internal Medicine)    Patient Active Problem List   Diagnosis Code    Chronic hepatitis C without hepatic coma ([facility] B18.2    Environmental and seasonal allergies J30.89    Essential hypertension I10    Nightmare F51.5    History of drug abuse in remission ([facility] F19.11     Rupali Carrion returns to the [address] for management of chronic HCV. The active problem list, all pertinent past medical history, medications, liver histology and laboratory findings related to the liver disorder were reviewed with the patient. The patient is a 54 y.o.  female who was found to have abnormalities in liver chemistries and subsequently tested positive for chronic HCV remotely. Imaging of the liver was either not performed or the results are not available to me. An assessment of liver fibrosis with HCV FibroSure suggested F2 fibrosis. The patient was treated with standard interferon and then peginterferon with ribavirin. The patient was treated for 6 weeks. The patient tolerated treatment poorly and had to prematurely discontinue therapy. She completed 8 weeks of Mavyret in mid June 2020. This is her SVR visit.      The patient has no symptoms which can be attributed to the liver disorder. The patient has not experienced the following symptoms: pain in the right side over the liver, yellowing of the eyes or skin or swelling of the abdomen. The patient completes all daily activities without any functional limitations. ASSESSMENT AND PLAN:  Chronic HCV   Chronic HCV with septal fibrosis. She will get labs today. Liver transaminases are elevated. ALP is normal. Liver function is normal. Total bilirubin is normal. Serum albumin is normal. The platelet count is normal.     Will perform laboratory testing to monitor liver function and degree of liver injury. This included BMP, CBC and hepatic panel. Please call pt or text her results. Chronic HCV treatment  The patient was previously treated for HCV with peginterferon and ribavirin. The previous treatment response was possible partial virologic response. She has completed 8 weeks of Mavyret mid June 2020. She relapsed. She has now completed 8 weeks of Vosevi (of 12 weeks total). Constipation  She had some issues with constipation after starting the medication but this has resolved. Screening for hepatocellular carcinoma  HCC screening is not necessary if the patient has no evidence of cirrhosis. Treatment of other medical problems in patients with chronic liver disease  There are no contraindications for the patient to take most medications necessary for treatment of other medical issues. The patient can take any medications utilized for treatment of DM and/or statins to treat hypercholesterolemia. The patient does not consume alcohol on a daily basis. Normal doses of acetaminophen, as recommended on the label of the bottle, are not hepatotoxic except in the setting of daily alcohol use. Even patients with cirrhosis can utilize acetaminophen for pain.     Counseling for alcohol in patients with chronic liver disease  The patient was counseled regarding alcohol consumption and the effect of alcohol on chronic liver disease. The patient does not consume any significant amount of alcohol. Vaccinations   Recommend vaccination against viral hepatitis A and B, as there is no documented immunity. Routine vaccinations against other bacterial and viral agents can be performed as indicated. Annual flu vaccination should be administered if indicated. ALLERGIES  No Known Allergies    Current Outpatient Medications on File Prior to Visit   Medication Sig Dispense Refill    sofosbuvir-velpatas-voxilaprev (Vosevi) 400-100-100 mg tab Take 1 Tab by mouth daily. 28 Tab 2    lisinopril-hydroCHLOROthiazide (PRINZIDE, ZESTORETIC) 10-12.5 mg per tablet Take  by mouth daily.  buPROPion XL (WELLBUTRIN XL) 300 mg XL tablet Take 300 mg by mouth every morning.  prazosin (MINIPRESS) 2 mg capsule 1 capsule at bedtime      naltrexone microspheres (VIVITROL) 380 mg ER injection as directed       No current facility-administered medications on file prior to visit. SYSTEM REVIEW NOT RELATED TO LIVER DISEASE OR REVIEWED ABOVE:  Constitution systems: Negative for fever, chills, weight gain, weight loss. Eyes: Negative for visual changes. ENT: Negative for sore throat, painful swallowing. Respiratory: Negative for cough, hemoptysis, SOB. Cardiology: Negative for chest pain, palpitations. GI:  Negative for constipation or diarrhea. : Negative for urinary frequency, dysuria, hematuria, nocturia. Skin: Negative for rash. Hematology: Now having heavy bleeding with menses, some easy bruising. Negative for blood clots. Musculo-skeletal: Negative for back pain, muscle pain, weakness. Neurologic: Negative for headaches, dizziness, vertigo, memory problems not related to HE. Psychology: Negative for anxiety, depression. FAMILY HISTORY:  The father has COPD, addiction history and CAD. The mother passed from myelodysplastic leukemia (acquired). There is no family history of liver disease.     There is no family history of immune disorders. SOCIAL HISTORY:  The patient is . The patient has 3 children and 5 grandchildren. The patient smokes 1 PPD. The patient does not drink alcohol. The patient currently works full time with [employer]. No drugs since 1/17/2018. PHYSICAL EXAMINATION:  Visit Vitals  BP (P) 125/81 (BP 1 Location: Left arm, BP Patient Position: Sitting)   Pulse (P) 85   Temp 97.2 °F (36.2 °C) (Temporal)   Ht 5' 2\" (1.575 m)   Wt 166 lb 12.8 oz (75.7 kg)   SpO2 (P) 99%   BMI 30.51 kg/m²       PHYSICAL EXAMINATION:  VS: per nursing note  General: No acute distress. Eyes: Sclera anicteric. ENT: No oral lesions. Nodes: No adenopathy. Skin: No spider angiomata. No jaundice. No palmar erythema. Respiratory: Lungs clear to auscultation. Cardiovascular: Regular heart rate. No murmurs. No JVD. Abdomen: Soft non-tender, liver size normal to percussion/palpation. Spleen not palpable. No obvious ascites. Extremities: No edema. No muscle wasting. No gross arthritic changes. Neurologic: Alert and oriented. Cranial nerves grossly intact. No asterixis.     LABORATORY STUDIES:  Liver [place] of [address] [place] [address] 10/12/2020 4/29/2020   WBC 3.4 - 10.8 x10E3/uL 8.1    ANC 1.4 - 7.0 x10E3/uL 3.4    HGB 11.1 - 15.9 g/dL 14.8     - 450 x10E3/uL 185    INR 0.8 - 1.2  0.9   AST 0 - 40 IU/L 69 (H)    ALT 0 - 32 IU/L 57 (H) 26   Alk Phos 39 - 117 IU/L 86    Bili, Total 0.0 - 1.2 mg/dL 0.5 0.2   Bili, Direct 0.00 - 0.40 mg/dL 0.13    Albumin 3.8 - 4.9 g/dL 4.4    BUN 6 - 24 mg/dL 13    Creat 0.57 - 1.00 mg/dL 0.86    Na 134 - 144 mmol/L 140    K 3.5 - 5.2 mmol/L 4.4    Cl 96 - 106 mmol/L 107 (H)    CO2 20 - 29 mmol/L 19 (L)    Glucose 65 - 99 mg/dL 90      From 3/2020  AST/ALT/ALP/T Bili/ALB: 36/29/80/0.4/4.5/4.5  WBC/HB/PLT/INR: 8.1/14.3/165  BUN/CREAT: 12/0.86    SEROLOGIES:  Serologies Latest Ref Rng & Units 4/29/2020   Hep A Ab, Total Negative Negative Hep B Surface Ag Negative Negative   Hep B Core Ab, Total Negative Negative   Hep B Surface AB QL  Non Reactive   Hep C Genotype  1a     3/2020 HCV RNA 7,440,000    LIVER HISTOLOGY:  4/2020. HCV FibroSure. F2.     ENDOSCOPIC PROCEDURES:  Not available or performed    RADIOLOGY:  Not available or performed    OTHER TESTING:  Not available or performed    FOLLOW-UP:  All of the issues listed above in the assessment and plan were discussed with the patient. All questions were answered. The patient expressed a clear understanding of the above. Follow up at the [place] [address] in 4 months for reassessment and SVR. Will repeat FibroSure 1 year status post completion of treatment.      JACKY Martínez-BC  Liver [place] [facility] [address] [place] [address], [address] [facility], [facility] 22.  [phone]

## 2021-01-08 LAB
ALBUMIN SERPL-MCNC: 4.2 G/DL (ref 3.5–5)
ALBUMIN/GLOB SERPL: 1.2 {RATIO} (ref 1.1–2.2)
ALP SERPL-CCNC: 78 U/L (ref 45–117)
ALT SERPL-CCNC: 19 U/L (ref 12–78)
ANION GAP SERPL CALC-SCNC: 7 MMOL/L (ref 5–15)
AST SERPL-CCNC: 22 U/L (ref 15–37)
BILIRUB DIRECT SERPL-MCNC: 0.2 MG/DL (ref 0–0.2)
BILIRUB SERPL-MCNC: 0.4 MG/DL (ref 0.2–1)
BUN SERPL-MCNC: 14 MG/DL (ref 6–20)
BUN/CREAT SERPL: 15 (ref 12–20)
CALCIUM SERPL-MCNC: 9.8 MG/DL (ref 8.5–10.1)
CHLORIDE SERPL-SCNC: 112 MMOL/L (ref 97–108)
CO2 SERPL-SCNC: 24 MMOL/L (ref 21–32)
CREAT SERPL-MCNC: 0.93 MG/DL (ref 0.55–1.02)
ERYTHROCYTE [DISTWIDTH] IN BLOOD BY AUTOMATED COUNT: 12.4 % (ref 11.5–14.5)
GLOBULIN SER CALC-MCNC: 3.6 G/DL (ref 2–4)
GLUCOSE SERPL-MCNC: 90 MG/DL (ref 65–100)
HCT VFR BLD AUTO: 43.8 % (ref 35–47)
HCV RNA SERPL QL NAA+PROBE: NEGATIVE
HGB BLD-MCNC: 14.5 G/DL (ref 11.5–16)
MCH RBC QN AUTO: 30 PG (ref 26–34)
MCHC RBC AUTO-ENTMCNC: 33.1 G/DL (ref 30–36.5)
MCV RBC AUTO: 90.7 FL (ref 80–99)
NRBC # BLD: 0 K/UL (ref 0–0.01)
NRBC BLD-RTO: 0 PER 100 WBC
PLATELET # BLD AUTO: 186 K/UL (ref 150–400)
PMV BLD AUTO: 12.1 FL (ref 8.9–12.9)
POTASSIUM SERPL-SCNC: 3.8 MMOL/L (ref 3.5–5.1)
PROT SERPL-MCNC: 7.8 G/DL (ref 6.4–8.2)
RBC # BLD AUTO: 4.83 M/UL (ref 3.8–5.2)
SODIUM SERPL-SCNC: 143 MMOL/L (ref 136–145)
WBC # BLD AUTO: 8.8 K/UL (ref 3.6–11)

## 2021-04-09 NOTE — PROGRESS NOTES
Mayela Strong MD, Ellie Velasco MD Christeen Ligas, CRISSY Swain, [facility]     Rose Mary Wise, [facility]   Silvino Tavares [facility]    Fontenellerhys Rivera, [facility]       Danni Maldonado [facility] 136    at [address], [address] [place] [address], [facility] 22.    [phone]    FAX: [address] [place] [address], [address]    [phone]    FAX: [phone]     Patient Care Team:  Vero Franco MD as PCP - General (Internal Medicine)    Patient Active Problem List   Diagnosis Code    Chronic hepatitis C without hepatic coma ([facility] B18.2    Environmental and seasonal allergies J30.89    Essential hypertension I10    Nightmare F51.5    History of drug abuse in remission ([facility] F19.11     Sharon Desai returns to the [address] for management of chronic HCV. The active problem list, all pertinent past medical history, medications, liver histology and laboratory findings related to the liver disorder were reviewed with the patient. The patient is a 64 y.o.  female who was found to have abnormalities in liver chemistries and subsequently tested positive for chronic HCV remotely. Imaging of the liver was either not performed or the results are not available to me. An assessment of liver fibrosis with HCV FibroSure suggested F2 fibrosis. The patient was treated with standard interferon and then peginterferon with ribavirin. The patient was treated for 6 weeks. The patient tolerated treatment poorly and had to prematurely discontinue therapy. She completed 8 weeks of Mavyret in mid June 2020, but relapsed. She was treated with 12 weeks of Vosevi (10/2020-1/2021).  This is her SVR visit.     The patient has no symptoms which can be attributed to the liver disorder. She has had some unintentional weight loss with loss of appetite and food not settling well after she eats. The patient has not experienced the following symptoms: pain in the right side over the liver, yellowing of the eyes or skin or swelling of the abdomen. The patient completes all daily activities without any functional limitations. ASSESSMENT AND PLAN:  Chronic HCV   Chronic HCV with septal fibrosis. She will get labs today. Liver transaminases are normal. ALP is normal. Liver function is normal. Total bilirubin is normal. Serum albumin is normal. The platelet count is normal.     Will perform laboratory testing to monitor liver function and degree of liver injury. This included BMP, CBC and hepatic panel. Please call pt or text her results. Chronic HCV treatment  The patient was previously treated for HCV with peginterferon and ribavirin. The previous treatment response was possible partial virologic response. She completed 8 weeks of Mavyret mid June 2020 and then relapsed. She was treated with 12 weeks of Vosevi from 10/2020 to 1/2021. She was negative at end of treatment. This is her SVR visit. Weight loss/loss of appetite  I have provided her the number for [facility] to make an appointment and get an EGD if necessary. She is very worried about this. Screening for hepatocellular carcinoma  HCC screening is not necessary if the patient has no evidence of cirrhosis. Treatment of other medical problems in patients with chronic liver disease  There are no contraindications for the patient to take most medications necessary for treatment of other medical issues. The patient can take any medications utilized for treatment of DM and/or statins to treat hypercholesterolemia. The patient does not consume alcohol on a daily basis.  Normal doses of acetaminophen, as recommended on the label of the bottle, are not hepatotoxic except in the setting of daily alcohol use. Even patients with cirrhosis can utilize acetaminophen for pain. Counseling for alcohol in patients with chronic liver disease  The patient was counseled regarding alcohol consumption and the effect of alcohol on chronic liver disease. The patient does not consume any significant amount of alcohol. Vaccinations   Recommend vaccination against viral hepatitis A and B, as there is no documented immunity. Routine vaccinations against other bacterial and viral agents can be performed as indicated. Annual flu vaccination should be administered if indicated. ALLERGIES  No Known Allergies    Current Outpatient Medications on File Prior to Visit   Medication Sig Dispense Refill    naltrexone microspheres (VIVITROL) 380 mg ER injection as directed      [DISCONTINUED] sofosbuvir-velpatas-voxilaprev (Vosevi) 400-100-100 mg tab Take 1 Tab by mouth daily. 28 Tab 2    lisinopril-hydroCHLOROthiazide (PRINZIDE, ZESTORETIC) 10-12.5 mg per tablet Take  by mouth daily.  buPROPion XL (WELLBUTRIN XL) 300 mg XL tablet Take 300 mg by mouth every morning.  prazosin (MINIPRESS) 2 mg capsule 1 capsule at bedtime       No current facility-administered medications on file prior to visit. FAMILY HISTORY:  The father has COPD, addiction history and CAD. The mother passed from myelodysplastic leukemia (acquired). There is no family history of liver disease. There is no family history of immune disorders. SOCIAL HISTORY:  The patient is . The patient has 3 children and 5 grandchildren. The patient smokes 1 PPD. The patient does not drink alcohol. The patient currently works full time with [employer]. No drugs since 1/17/2018.     PHYSICAL EXAMINATION:  Visit Vitals  /86 (BP 1 Location: Right upper arm, BP Patient Position: Sitting, BP Cuff Size: Adult)   Pulse 75   Temp 98.3 °F (36.8 °C) (Temporal) Resp 17   Ht 5' 2\" (1.575 m)   Wt 148 lb (67.1 kg)   SpO2 97%   BMI 27.07 kg/m²       PHYSICAL EXAMINATION:  VS: per nursing note  General: No acute distress. Eyes: Sclera anicteric. ENT: No oral lesions. Nodes: No adenopathy. Skin: No spider angiomata. No jaundice. No palmar erythema. Respiratory: Lungs clear to auscultation. Cardiovascular: Regular heart rate. No murmurs. No JVD. Abdomen: Soft non-tender, liver size normal to percussion/palpation. Spleen not palpable. No obvious ascites. Extremities: No edema. No muscle wasting. No gross arthritic changes. Neurologic: Alert and oriented. Cranial nerves grossly intact. No asterixis. LABORATORY STUDIES:  [facility] [place] [facility] [address] Units 10/12/2020 4/29/2020   WBC 3.4 - 10.8 x10E3/uL 8.1    ANC 1.4 - 7.0 x10E3/uL 3.4    HGB 11.1 - 15.9 g/dL 14.8     - 450 x10E3/uL 185    INR 0.8 - 1.2  0.9   AST 0 - 40 IU/L 69 (H)    ALT 0 - 32 IU/L 57 (H) 26   Alk Phos 39 - 117 IU/L 86    Bili, Total 0.0 - 1.2 mg/dL 0.5 0.2   Bili, Direct 0.00 - 0.40 mg/dL 0.13    Albumin 3.8 - 4.9 g/dL 4.4    BUN 6 - 24 mg/dL 13    Creat 0.57 - 1.00 mg/dL 0.86    Na 134 - 144 mmol/L 140    K 3.5 - 5.2 mmol/L 4.4    Cl 96 - 106 mmol/L 107 (H)    CO2 20 - 29 mmol/L 19 (L)    Glucose 65 - 99 mg/dL 90      From 3/2020  AST/ALT/ALP/T Bili/ALB: 36/29/80/0.4/4.5/4.5  WBC/HB/PLT/INR: 8.1/14.3/165  BUN/CREAT: 12/0.86    SEROLOGIES:  Virology Latest Ref Rng & Units 1/5/2021 10/12/2020   HCV RNA, PENELOPE, QL Negative   Negative Positive (A)     Serologies Latest Ref Rng & Units 4/29/2020   Hep A Ab, Total Negative Negative   Hep B Surface Ag Negative Negative   Hep B Core Ab, Total Negative Negative   Hep B Surface AB QL  Non Reactive   Hep C Genotype  1a     3/2020 HCV RNA 7,440,000    LIVER HISTOLOGY:  4/2020. HCV FibroSure.  F2.     ENDOSCOPIC PROCEDURES:  Not available or performed    RADIOLOGY:  Not available or performed    OTHER TESTING:  Not available or performed    FOLLOW-UP:  All of the issues listed above in the assessment and plan were discussed with the patient. All questions were answered. The patient expressed a clear understanding of the above. Follow up at the [place] [address] in 6 months for reassessment. Will repeat FibroSure 1 year status post completion of treatment.      JACKY Davidson-BC  [facility] [place] [address], [address] [address] Larry  22.  [phone]

## 2021-04-12 ENCOUNTER — OFFICE VISIT (OUTPATIENT)
Dept: HEMATOLOGY | Age: 56
End: 2021-04-12
Payer: COMMERCIAL

## 2021-04-12 VITALS
SYSTOLIC BLOOD PRESSURE: 117 MMHG | RESPIRATION RATE: 17 BRPM | TEMPERATURE: 98.3 F | HEIGHT: 62 IN | WEIGHT: 148 LBS | BODY MASS INDEX: 27.23 KG/M2 | DIASTOLIC BLOOD PRESSURE: 86 MMHG | HEART RATE: 75 BPM | OXYGEN SATURATION: 97 %

## 2021-04-12 DIAGNOSIS — B18.2 CHRONIC HEPATITIS C WITHOUT HEPATIC COMA (HCC): Primary | ICD-10-CM

## 2021-04-12 PROCEDURE — 99213 OFFICE O/P EST LOW 20 MIN: CPT | Performed by: NURSE PRACTITIONER

## 2021-04-12 NOTE — PROGRESS NOTES
Identified pt with two pt identifiers(name and ). Reviewed record in preparation for visit and have obtained necessary documentation. Chief Complaint   Patient presents with    Hepatitis C     6 month f/u      Vitals:    21 0800   BP: 117/86   Pulse: 75   Resp: 17   Temp: 98.3 °F (36.8 °C)   TempSrc: Temporal   SpO2: 97%   Weight: 148 lb (67.1 kg)   Height: 5' 2\" (1.575 m)   PainSc:   0 - No pain       Health Maintenance Review: Patient reminded of \"due or due soon\" health maintenance. I have asked the patient to contact his/her primary care provider (PCP) for follow-up on his/her health maintenance. Coordination of Care Questionnaire:  :   1) Have you been to an emergency room, urgent care, or hospitalized since your last visit? If yes, where when, and reason for visit? no       2. Have seen or consulted any other health care provider since your last visit? If yes, where when, and reason for visit? NO      Patient is accompanied by self I have received verbal consent from Melissa Sevilla to discuss any/all medical information while they are present in the room.

## 2021-10-07 NOTE — PROGRESS NOTES
Pt requested reprint of labs and fax to [facility]. Will have Ibeth call pt and fax labs as requested.

## 2022-03-18 PROBLEM — I10 ESSENTIAL HYPERTENSION: Status: ACTIVE | Noted: 2020-10-12

## 2022-03-18 PROBLEM — J30.89 ENVIRONMENTAL AND SEASONAL ALLERGIES: Status: ACTIVE | Noted: 2020-04-21

## 2022-03-18 PROBLEM — F51.5 NIGHTMARE: Status: ACTIVE | Noted: 2020-10-12

## 2022-03-19 PROBLEM — B18.2 CHRONIC HEPATITIS C WITHOUT HEPATIC COMA (HCC): Status: ACTIVE | Noted: 2020-04-21

## 2022-03-20 PROBLEM — F19.11 HISTORY OF DRUG ABUSE IN REMISSION (HCC): Status: ACTIVE | Noted: 2020-10-12

## 2022-04-01 ENCOUNTER — TRANSCRIBE ORDER (OUTPATIENT)
Dept: SCHEDULING | Age: 57
End: 2022-04-01

## 2022-04-01 DIAGNOSIS — Z87.891 HISTORY OF SMOKING 30 OR MORE PACK YEARS: Primary | ICD-10-CM

## 2022-04-01 DIAGNOSIS — Z12.31 ENCOUNTER FOR SCREENING MAMMOGRAM FOR BREAST CANCER: Primary | ICD-10-CM

## 2022-04-01 DIAGNOSIS — J41.0 SMOKERS' COUGH (HCC): ICD-10-CM

## 2022-04-04 ENCOUNTER — TRANSCRIBE ORDER (OUTPATIENT)
Dept: SCHEDULING | Age: 57
End: 2022-04-04

## 2022-04-04 DIAGNOSIS — Z87.891 HISTORY OF TOBACCO USE: Primary | ICD-10-CM

## 2022-04-14 ENCOUNTER — TRANSCRIBE ORDER (OUTPATIENT)
Dept: SCHEDULING | Age: 57
End: 2022-04-14

## 2022-04-14 DIAGNOSIS — M54.12 CERVICAL RADICULOPATHY: ICD-10-CM

## 2022-04-14 DIAGNOSIS — M54.2 NECK PAIN: Primary | ICD-10-CM

## 2022-04-14 DIAGNOSIS — M50.30 DDD (DEGENERATIVE DISC DISEASE), CERVICAL: ICD-10-CM

## 2022-04-22 ENCOUNTER — HOSPITAL ENCOUNTER (OUTPATIENT)
Dept: CT IMAGING | Age: 57
End: 2022-04-22
Payer: COMMERCIAL

## 2022-04-22 ENCOUNTER — HOSPITAL ENCOUNTER (OUTPATIENT)
Dept: MAMMOGRAPHY | Age: 57
Discharge: HOME | End: 2022-04-22
Payer: COMMERCIAL

## 2022-04-22 ENCOUNTER — HOSPITAL ENCOUNTER (OUTPATIENT)
Dept: MRI IMAGING | Age: 57
End: 2022-04-22
Attending: ORTHOPAEDIC SURGERY
Payer: COMMERCIAL

## 2022-04-22 DIAGNOSIS — Z12.31 ENCOUNTER FOR SCREENING MAMMOGRAM FOR BREAST CANCER: ICD-10-CM

## 2022-04-22 PROCEDURE — 77067 SCR MAMMO BI INCL CAD: CPT

## 2022-05-15 ENCOUNTER — HOSPITAL ENCOUNTER (OUTPATIENT)
Dept: MRI IMAGING | Age: 57
Discharge: HOME | End: 2022-05-15
Attending: ORTHOPAEDIC SURGERY
Payer: COMMERCIAL

## 2022-05-15 DIAGNOSIS — M54.2 NECK PAIN: ICD-10-CM

## 2022-05-15 DIAGNOSIS — M54.12 CERVICAL RADICULOPATHY: ICD-10-CM

## 2022-05-15 DIAGNOSIS — M50.30 DDD (DEGENERATIVE DISC DISEASE), CERVICAL: ICD-10-CM

## 2022-05-15 PROCEDURE — 72141 MRI NECK SPINE W/O DYE: CPT

## 2022-05-26 ENCOUNTER — HOSPITAL ENCOUNTER (OUTPATIENT)
Dept: CT IMAGING | Age: 57
Discharge: HOME | End: 2022-05-26
Payer: COMMERCIAL

## 2022-05-26 VITALS — HEIGHT: 62 IN | WEIGHT: 145 LBS | BODY MASS INDEX: 26.68 KG/M2

## 2022-05-26 DIAGNOSIS — Z87.891 HISTORY OF TOBACCO USE: ICD-10-CM

## 2022-05-26 PROCEDURE — 71271 CT THORAX LUNG CANCER SCR C-: CPT

## 2022-06-29 ENCOUNTER — HOSPITAL ENCOUNTER (OUTPATIENT)
Dept: PREADMISSION TESTING | Age: 57
Discharge: HOME | End: 2022-06-29
Payer: MEDICAID

## 2022-06-29 VITALS
RESPIRATION RATE: 18 BRPM | DIASTOLIC BLOOD PRESSURE: 88 MMHG | SYSTOLIC BLOOD PRESSURE: 144 MMHG | TEMPERATURE: 97.7 F | WEIGHT: 149.69 LBS | BODY MASS INDEX: 27.55 KG/M2 | HEIGHT: 62 IN | HEART RATE: 61 BPM

## 2022-06-29 LAB
ABO + RH BLD: NORMAL
ANION GAP SERPL CALC-SCNC: 8 MMOL/L (ref 5–15)
APPEARANCE UR: CLEAR
ATRIAL RATE: 55 BPM
BACTERIA URNS QL MICRO: NEGATIVE /HPF
BILIRUB UR QL: NEGATIVE
BLOOD GROUP ANTIBODIES SERPL: NORMAL
BUN SERPL-MCNC: 6 MG/DL (ref 6–20)
BUN/CREAT SERPL: 7 (ref 12–20)
CALCIUM SERPL-MCNC: 10 MG/DL (ref 8.5–10.1)
CALCULATED P AXIS, ECG09: 33 DEGREES
CALCULATED R AXIS, ECG10: 16 DEGREES
CALCULATED T AXIS, ECG11: 46 DEGREES
CHLORIDE SERPL-SCNC: 110 MMOL/L (ref 97–108)
CO2 SERPL-SCNC: 25 MMOL/L (ref 21–32)
COLOR UR: NORMAL
CREAT SERPL-MCNC: 0.86 MG/DL (ref 0.55–1.02)
DIAGNOSIS, 93000: NORMAL
EPITH CASTS URNS QL MICRO: NORMAL /LPF
ERYTHROCYTE [DISTWIDTH] IN BLOOD BY AUTOMATED COUNT: 12.3 % (ref 11.5–14.5)
EST. AVERAGE GLUCOSE BLD GHB EST-MCNC: 97 MG/DL
GLUCOSE SERPL-MCNC: 90 MG/DL (ref 65–100)
GLUCOSE UR STRIP.AUTO-MCNC: NEGATIVE MG/DL
HBA1C MFR BLD: 5 % (ref 4–5.6)
HCT VFR BLD AUTO: 43.2 % (ref 35–47)
HGB BLD-MCNC: 14.6 G/DL (ref 11.5–16)
HGB UR QL STRIP: NEGATIVE
HYALINE CASTS URNS QL MICRO: NORMAL /LPF (ref 0–5)
INR PPP: 0.9 (ref 0.9–1.1)
KETONES UR QL STRIP.AUTO: NEGATIVE MG/DL
LEUKOCYTE ESTERASE UR QL STRIP.AUTO: NEGATIVE
MCH RBC QN AUTO: 30.8 PG (ref 26–34)
MCHC RBC AUTO-ENTMCNC: 33.8 G/DL (ref 30–36.5)
MCV RBC AUTO: 91.1 FL (ref 80–99)
NITRITE UR QL STRIP.AUTO: NEGATIVE
NRBC # BLD: 0 K/UL (ref 0–0.01)
NRBC BLD-RTO: 0 PER 100 WBC
P-R INTERVAL, ECG05: 170 MS
PH UR STRIP: 5.5 [PH] (ref 5–8)
PLATELET # BLD AUTO: 174 K/UL (ref 150–400)
PMV BLD AUTO: 11.8 FL (ref 8.9–12.9)
POTASSIUM SERPL-SCNC: 4.3 MMOL/L (ref 3.5–5.1)
PROT UR STRIP-MCNC: NEGATIVE MG/DL
PROTHROMBIN TIME: 9.8 SEC (ref 9–11.1)
Q-T INTERVAL, ECG07: 430 MS
QRS DURATION, ECG06: 68 MS
QTC CALCULATION (BEZET), ECG08: 411 MS
RBC # BLD AUTO: 4.74 M/UL (ref 3.8–5.2)
RBC #/AREA URNS HPF: NORMAL /HPF (ref 0–5)
SODIUM SERPL-SCNC: 143 MMOL/L (ref 136–145)
SP GR UR REFRACTOMETRY: 1.01 (ref 1–1.03)
SPECIMEN EXP DATE BLD: NORMAL
UA: UC IF INDICATED,UAUC: NORMAL
UROBILINOGEN UR QL STRIP.AUTO: 0.2 EU/DL (ref 0.2–1)
VENTRICULAR RATE, ECG03: 55 BPM
WBC # BLD AUTO: 7 K/UL (ref 3.6–11)
WBC URNS QL MICRO: NORMAL /HPF (ref 0–4)

## 2022-06-29 PROCEDURE — 86900 BLOOD TYPING SEROLOGIC ABO: CPT

## 2022-06-29 PROCEDURE — 36415 COLL VENOUS BLD VENIPUNCTURE: CPT

## 2022-06-29 PROCEDURE — 93005 ELECTROCARDIOGRAM TRACING: CPT

## 2022-06-29 PROCEDURE — 81001 URINALYSIS AUTO W/SCOPE: CPT

## 2022-06-29 PROCEDURE — 83036 HEMOGLOBIN GLYCOSYLATED A1C: CPT

## 2022-06-29 PROCEDURE — 80048 BASIC METABOLIC PNL TOTAL CA: CPT

## 2022-06-29 PROCEDURE — 85027 COMPLETE CBC AUTOMATED: CPT

## 2022-06-29 PROCEDURE — 85610 PROTHROMBIN TIME: CPT

## 2022-06-29 RX ORDER — TIZANIDINE HYDROCHLORIDE 2 MG/1
2 CAPSULE, GELATIN COATED ORAL
COMMUNITY

## 2022-06-29 RX ORDER — ASPIRIN 81 MG
TABLET, DELAYED RELEASE (ENTERIC COATED) ORAL
COMMUNITY

## 2022-06-29 RX ORDER — GABAPENTIN 100 MG/1
100 CAPSULE ORAL 3 TIMES DAILY
Status: ON HOLD | COMMUNITY
End: 2022-07-06 | Stop reason: SDUPTHER

## 2022-06-29 RX ORDER — LISINOPRIL 40 MG/1
40 TABLET ORAL
COMMUNITY

## 2022-06-29 NOTE — PERIOP NOTES
[facility] INSTRUCTIONS  ORTHOPAEDIC    Surgery Date:   7/5/22    Your surgeon's office or [facility] staff will call you between 4 PM- 8 PM the day before surgery with your arrival time. If your surgery is on a Monday, you will receive a call the preceding Friday. 1. Please report to [facility] Patient Access/Admitting on the 1st floor. Bring your insurance card, photo identification, and any copayment (if applicable). 2. If you are going home the same day of your surgery, you must have a responsible adult to drive you home. You need to have a responsible adult to stay with you the first 24 hours after surgery and you should not drive a car for 24 hours following your surgery. 3. Do NOT eat any solid foods after midnight the night before surgery including candy, mints or gum. You may drink clear liquids from midnight until 1 hour prior to arrival time. You may drink up to 12 ounces at one time every 4 hours. 4. Do NOT drink alcohol or smoke 24 hours before surgery. STOP smoking for 14 days prior as it helps with breathing and healing after surgery. 5. If your arrival time is 3pm or later, you may eat a light breakfast before 8am (toast, bagel-no butter, black coffee, plain tea, fruit juice-no pulp) Please note special instructions, if applicable, below for medications. 6. If you are being admitted to the hospital,please leave personal belongings/luggage in your car until you have an assigned hospital room number. 7. Please wear comfortable clothes. Wear your glasses instead of contacts. We ask that all money, jewelry and valuables be left at home. Wear no make up, particularly mascara, the day of surgery. 8.  All body piercings, rings, and jewelry need to be removed and left at home. Please remove any nail polish or artificial nails from your fingernails. Please wear your hair loose or down. Please no pony-tails, buns, or any metal hair accessories.  If you shower the morning of surgery, please do not apply any lotions or powders afterwards. You may wear deodorant. Do not shave any body area within 24 hours of your surgery. 9. Please follow all instructions to avoid any potential surgical cancellation. 10. Should your physical condition change, (i.e. fever, cold, flu, etc.) please notify your surgeon as soon as possible. 11. It is important to be on time. If a situation occurs where you may be delayed, please call:  [phone] / [phone] on the day of surgery. 12. The Preadmission Testing staff can be reached at [phone]. 13. Special instructions: NONE    Current Outpatient Medications   Medication Sig    lisinopriL (PRINIVIL, ZESTRIL) 40 mg tablet Take 40 mg by mouth nightly. PT REPORTS IF BP REMAINS HIGH SHE TAKES AN EXTRA 20 MG, NOT PER DR. Shirley Zapata    gabapentin (NEURONTIN) 100 mg capsule Take 100 mg by mouth three (3) times daily. PT ONLY TAKING AT HS    tiZANidine (ZANAFLEX) 2 mg capsule Take 2 mg by mouth every eight (8) hours as needed. PT ONLY TAKES AT HS    docusate sodium (Stool Softener) 100 mg tab Take  by mouth nightly.  OTHER VITAMINS AND SUPPLEMENTS    naltrexone microspheres (VIVITROL) 380 mg ER injection every thirty (30) days. ON HOLD UNTIL AFTER SURGERY    buPROPion XL (WELLBUTRIN XL) 300 mg XL tablet Take 300 mg by mouth every morning.  prazosin (MINIPRESS) 2 mg capsule nightly as needed (NIGHT TERRORS). No current facility-administered medications for this encounter. 1. YOU MUST ONLY TAKE THESE MEDICATIONS THE MORNING OF SURGERY WITH A SIP OF WATER: WELLBUTRIN  2. MEDICATIONS TO TAKE THE MORNING OF SURGERY ONLY IF NEEDED: NONE  3. HOLD these prescription medications BEFORE Surgery: DO NOT TAKE LISINOPRIL THE DAY OF SURGERY (PT SOMETIMES TAKES EXTRA DOSE IN THE AM IF BP IS HIGH)  4. Ask your surgeon/prescribing physician about when/if to STOP taking these medications: NONE  5.  Stop any non-steroidal anti-inflammatory drugs (i.e. Ibuprofen, Naproxen, Advil, Aleve) 3 days before surgery. You may take Tylenol. STOP all vitamins and herbal supplements 1 week prior to  surgery. 6. If you are currently taking Plavix, Coumadin, or any other blood-thinning/anticoagulant medication contact your prescribing physician for instructions. Preventing Infections Before and After - Your Surgery    IMPORTANT INSTRUCTIONS    You play an important role in your health and preparation for surgery. To reduce the germs on your skin you will need to shower with CHG soap (Chorhexidine gluconate 4%) two times before surgery. CHG soap (Hibiclens, Hex-A-Clens or store brand)   CHG soap will be provided at your Preadmission Testing (PAT) appointment.  If you do not have a PAT appointment before surgery, you may arrange to  CHG soap from our office or purchase CHG soap at a pharmacy, grocery or department store.  You need to purchase TWO 4 ounce bottles to use for your 2 showers. Steps to follow:  1. Wash your hair with your normal shampoo and your body with regular soap and rinse well to remove shampoo and soap from your skin. 2. Wet a clean washcloth and turn off the shower. 3. Put CHG soap on washcloth and apply to your entire body from the neck down. Do not use on your head, face or private parts(genitals). Do not use CHG soap on open sores, wounds or areas of skin irritation. 4. Wash you body gently for 5 minutes. Do not wash your skin too hard. This soap does not create lather. Pay special attention to your underarms and from your belly button to your feet. 5. Turn the shower back on and rinse well to get CHG soap off your body. 6. Pat your skin dry with a clean, dry towel. Do not apply lotions or moisturizer. 7. Put on clean clothes and sleep on fresh bed sheets and do not allow pets to sleep with you.     Shower with CHG soap 2 times before your surgery   The evening before your surgery   The morning of your surgery      Tips to help prevent infections after your surgery:  1. Protect your surgical wound from germs:  ? Hand washing is the most important thing you and your caregivers can do to prevent infections. ? Keep your bandage clean and dry! ? Do not touch your surgical wound. 2. Use clean, freshly washed towels and washcloths every time you shower; do not share bath linens with others. 3. Until your surgical wound is healed, wear clothing and sleep on bed linens each day that are clean and freshly washed. 4. Do not allow pets to sleep in your bed with you or touch your surgical wound. 5. Do not smoke - smoking delays wound healing. This may be a good time to stop smoking. 6. If you have diabetes, it is important for you to manage your blood sugar levels properly before your surgery as well as after your surgery. Poorly managed blood sugar levels slow down wound healing and prevent you from healing completely. Prevention of Infection  Testing for Staphylococcus aureus on your skin before surgery    Staphylococcus aureus (staph) is a common bacteria that is found on the body. It normally does not cause infection on healthy skin. Before surgery, you will be tested to see if you have staph by swabbing the inside of your nose. When you have an incision with surgery, the goal is to protect that incision from infection. Removal of the staph bacteria before surgery can decrease the risk of a surgical site infection. If your nose swab is positive for staph you will be called. Your treatment will include 2 steps:   Prescription for Mupirocin ointment to be used in each nostril twice a day for 5 days.  Showering with Chlorhexidine (CHG) liquid soap for 5 days prior to surgery. How to use Mupirocin ointment in your nose  1.  the prescription from your pharmacy. You will receive a large tube of ointment which will be big enough for all of your treatments.  You will apply this ointment to each nostril 2 times a day for 5 days.  2. Wash your hands with  gel or soap and water for 20 seconds before using ointment. 3. Place a pea-sized amount of ointment on a cotton Q-tip. 4. Apply ointment just inside of each nostril with the Q-tip. Do not push Q-tip or ointment deep inside you nose. 5. Press your nostrils together and massage for a few seconds. 6. Wash your hands with  gel or soap and water after you are finished. 7. Do not get ointment near your eyes. If it gets into your eyes, rinse them with cool water. 8. If you need to use nasal spray, clean the tip of the bottle with alcohol before use and do not use both at the same time. 9. If you are scheduled for COVID testing during the 5 days, do NOT apply morning dose until after the COVID test has been performed. How to use Chlorhexidine (CHG) 4% liquid soap  1. Purchase an 8 ounce bottle of CHG liquid soap (Chlorhexidine 4%, Hibiclens, Hex-A-Clens or store brand) at a pharmacy or grocery store. 2. Wash your hair with your normal shampoo and your body with regular soap and rinse well to remove shampoo and soap from your skin. 3. Wet a clean washcloth and turn off the shower. 4. Put CHG soap on washcloth and apply to your entire body from the neck down. Do not use on your head, face or private parts(genitals). Do not use CHG soap on open sores, wounds or areas of skin irritation. 5. Wash your body gently for 5 minutes. Do not wash your skin too hard. This soap does not create lather. Pay special attention to your underarms and from your belly button to your feet. 6. Turn the shower back on and rinse well to get CHG soap off your body. 7. Pat your skin dry with a clean, dry towel. Do not apply lotions or moisturizer. 8. Put on clean clothes and sleep on fresh bed sheets the night before surgery. Do not allow pets to sleep with you.     Eating and Drinking Before Surgery     You may eat a regular dinner at the usual time on the day before your surgery.  Do NOT eat any solid foods after midnight unless your arrival time at the hospital is 3pm or later.  You may drink clear liquids only from 12 midnight until 1 hours prior to your arrival time at the hospital on the day of your surgery. Do NOT drink alcohol.  Clear liquids include:  o Water  o Fruit juices without pulp( i.e. apple juice)  o Carbonated beverages  o Black coffee (no cream/milk)  o Tea (no cream/milk)  o Gatorade   You may drink up to 12-16 ounces at one time every 4 hours between the hours of midnight and 1 hour before your arrival time at the hospital. Example- if your arrival time at the hospital is 6am, you may drink 12-16 ounces of clear liquids no later than 5am.   If your arrival time at the hospital is 3pm or later, you may eat a light breakfast before 8am.   A light breakfast includes:  o Toast or bagel (no butter)  o Black coffee (no cream/milk)  o Tea (no cream/milk)  o Fruit juices without pulp ( i.e. apple juice)  o Do NOT eat meat, eggs, vegetables or fruit   If you have any questions, please contact your surgeon's office. Patient Information Regarding COVID Restrictions    Day of Procedure     Please park in the parking deck or any designated visitor parking lot.  Enter the facility through the [facility].   On the day of surgery, please provide the cell phone number of the person who will be waiting for you to the Patient Access representative at the time of registration.  Please wear a mask on the day of your procedure.  We are now allowing two designated visitors per stay. Pediatric patients may have 2 designated visitors. These two people may come in with you on the day of your procedure.  The designated visitor must also wear a mask.    Once your procedure and the immediate recovery period is completed, a nurse in the recovery area will contact your designated visitor to inform them of your room number or to otherwise review other pertinent information regarding your care.  Social distancing practices are to be adhered to in waiting areas and the cafeteria. The patient was contacted in person. She verbalized understanding of all instructions does not  need reinforcement. PT IS VERY CONCERNED ABOUT PAIN MANAGEMENT POST OP, SHE IS GOING TO REACH OUT TO [facility]IC[facility] FOR A POST OP PLAN OF CARE.

## 2022-06-30 LAB
BACTERIA SPEC CULT: NORMAL
BACTERIA SPEC CULT: NORMAL
SERVICE CMNT-IMP: NORMAL

## 2022-06-30 NOTE — PERIOP NOTES
PAT Nurse Practitioner   Pre-Operative Chart Review/Assessment:-ORTHOPEDIC/NEUROSURGICAL SPINE                Patient Name:  Gabino Brantley                                                           Age:   62 y.o.    :  1965     Today's Date:  2022     Date of PAT:   2022      Date of Surgery:    2022      Procedure(s):  C4-C7 ACDF     Surgeon:   Dr. Sorensen Husbands:       1)  PCP: Dr. Imelda Mccarthy        2)  Cardiac Clearance: EKG and METs reviewed. No further cardiac evaluation requested. PAT EKG: sinus bradycardia. 3)  Diabetic Treatment Consult: Not indicated. A1c-5.0       4)  Sleep Apnea evaluation:  Not indicated. KORY Score 2.       5)  Treatment for MRSA/Staph Aureus: Neg       6)  Additional Concerns: Current 0.25 PPD smoker, THC use, hx of heroin use(pt extremely anxious about receiving narcotics), Asthma, hx of Hep C, dep/anx              Vital Signs:         Vitals:    22 0826   BP: (!) 144/88   Pulse: 61   Resp: 18   Temp: 97.7 °F (36.5 °C)   Weight: 67.9 kg (149 lb 11.1 oz)   Height: 5' 2\" (1.575 m)            ____________________________________________  PAST MEDICAL HISTORY  Past Medical History:   Diagnosis Date    Arthritis     Asthma     Cancer ([address]     SQUAMOUS CELL ON NOSE    Chronic pain     BACK    Hypertension     Liver disease     HEP C- HAD TREATMENT    Menopause     Psychiatric disorder     ANXIETY AND DEPRESSION      ____________________________________________  PAST SURGICAL HISTORY  Past Surgical History:   Procedure Laterality Date    HX  SECTION      X2    HX OTHER SURGICAL      DOG BITES RIGHT ARM/HAND PLATES    HX WISDOM TEETH EXTRACTION       ____________________________________________  HOME MEDICATIONS    Current Outpatient Medications   Medication Sig    lisinopriL (PRINIVIL, ZESTRIL) 40 mg tablet Take 40 mg by mouth nightly.  PT REPORTS IF BP REMAINS HIGH SHE TAKES AN EXTRA 20 MG, NOT PER  ORDERS    gabapentin (NEURONTIN) 100 mg capsule Take 100 mg by mouth three (3) times daily. PT ONLY TAKING AT HS    tiZANidine (ZANAFLEX) 2 mg capsule Take 2 mg by mouth every eight (8) hours as needed. PT ONLY TAKES AT HS    docusate sodium (Stool Softener) 100 mg tab Take  by mouth nightly.  OTHER VITAMINS AND SUPPLEMENTS    naltrexone microspheres (VIVITROL) 380 mg ER injection every thirty (30) days. ON HOLD UNTIL AFTER SURGERY    buPROPion XL (WELLBUTRIN XL) 300 mg XL tablet Take 300 mg by mouth every morning.  prazosin (MINIPRESS) 2 mg capsule nightly as needed (NIGHT TERRORS).      No current facility-administered medications for this encounter.      ____________________________________________  ALLERGIES  Allergies   Allergen Reactions    Latex Other (comments)     CONDOMS CAUSE SEVERE IRRITATION    Penicillins Hives     PT REPORTS SEVERE NON-IGE-MEDIATED REACTIONS      ____________________________________________  SOCIAL HISTORY  Social History     Tobacco Use    Smoking status: Current Every Day Smoker     Packs/day: 1.00     Years: 50.00     Pack years: 50.00    Smokeless tobacco: Never Used    Tobacco comment: SMOKES 2 CIGS DAILY NOW   Substance Use Topics    Alcohol use: Never      ____________________________________________   Internal Administration   First Dose COVID-19, MODERNA BLUE border, Primary or Immunocompromised, (age 18y+), IM, 100 mcg/0.5mL  01/20/2021   Second Dose COVID-19, MODERNA BLUE border, Primary or Immunocompromised, (age 18y+), IM, 100 mcg/0.5mL  02/24/2021     ___________________________________________    Labs:     Hospital Outpatient Visit on 06/29/2022   Component Date Value Ref Range Status    Sodium 06/29/2022 143  136 - 145 mmol/L Final    Potassium 06/29/2022 4.3  3.5 - 5.1 mmol/L Final    Chloride 06/29/2022 110* 97 - 108 mmol/L Final    CO2 06/29/2022 25  21 - 32 mmol/L Final    Anion gap 06/29/2022 8  5 - 15 mmol/L Final    Glucose 06/29/2022 90 65 - 100 mg/dL Final    BUN 06/29/2022 6  6 - 20 MG/DL Final    Creatinine 06/29/2022 0.86  0.55 - 1.02 MG/DL Final    BUN/Creatinine ratio 06/29/2022 7* 12 - 20   Final    GFR est AA 06/29/2022 >60  >60 ml/min/1.73m2 Final    GFR est non-AA 06/29/2022 >60  >60 ml/min/1.73m2 Final    Estimated GFR is calculated using the IDMS-traceable Modification of Diet in Renal Disease (MDRD) Study equation, reported for both  Americans (GFRAA) and non- Americans (GFRNA), and normalized to 1.73m2 body surface area. The physician must decide which value applies to the patient.  Calcium 06/29/2022 10.0  8.5 - 10.1 MG/DL Final    WBC 06/29/2022 7.0  3.6 - 11.0 K/uL Final    RBC 06/29/2022 4.74  3.80 - 5.20 M/uL Final    HGB 06/29/2022 14.6  11.5 - 16.0 g/dL Final    HCT 06/29/2022 43.2  35.0 - 47.0 % Final    MCV 06/29/2022 91.1  80.0 - 99.0 FL Final    MCH 06/29/2022 30.8  26.0 - 34.0 PG Final    MCHC 06/29/2022 33.8  30.0 - 36.5 g/dL Final    RDW 06/29/2022 12.3  11.5 - 14.5 % Final    PLATELET 39/32/2839 680  150 - 400 K/uL Final    MPV 06/29/2022 11.8  8.9 - 12.9 FL Final    NRBC 06/29/2022 0.0  0  WBC Final    ABSOLUTE NRBC 06/29/2022 0.00  0.00 - 0.01 K/uL Final    Crossmatch Expiration 06/29/2022 07/08/2022,2359   Final    ABO/Rh(D) 06/29/2022 A NEGATIVE   Final    Antibody screen 06/29/2022 NEG   Final    INR 06/29/2022 0.9  0.9 - 1.1   Final    A single therapeutic range for Vit K antagonists may not be optimal for all indications - see June, 2008 issue of Chest, American College of Chest Physicians Evidence-Based Clinical Practice Guidelines, 8th Edition.     Prothrombin time 06/29/2022 9.8  9.0 - 11.1 sec Final    Color 06/29/2022 YELLOW/STRAW    Final    Color Reference Range: Straw, Yellow or Dark Yellow    Appearance 06/29/2022 CLEAR  CLEAR   Final    Specific gravity 06/29/2022 1.006  1.003 - 1.030   Final    pH (UA) 06/29/2022 5.5  5.0 - 8.0   Final    Protein 06/29/2022 Negative  NEG mg/dL Final    Glucose 06/29/2022 Negative  NEG mg/dL Final    Ketone 06/29/2022 Negative  NEG mg/dL Final    Bilirubin 06/29/2022 Negative  NEG   Final    Blood 06/29/2022 Negative  NEG   Final    Urobilinogen 06/29/2022 0.2  0.2 - 1.0 EU/dL Final    Nitrites 06/29/2022 Negative  NEG   Final    Leukocyte Esterase 06/29/2022 Negative  NEG   Final    UA:UC IF INDICATED 06/29/2022 CULTURE NOT INDICATED BY UA RESULT  CNI   Final    WBC 06/29/2022 0-4  0 - 4 /hpf Final    RBC 06/29/2022 0-5  0 - 5 /hpf Final    Epithelial cells 06/29/2022 FEW  FEW /lpf Final    Epithelial cell category consists of squamous cells and /or transitional urothelial cells. Renal tubular cells, if present, are separately identified as such.     Bacteria 06/29/2022 Negative  NEG /hpf Final    Hyaline cast 06/29/2022 0-2  0 - 5 /lpf Final    Ventricular Rate 06/29/2022 55  BPM Final    Atrial Rate 06/29/2022 55  BPM Final    P-R Interval 06/29/2022 170  ms Final    QRS Duration 06/29/2022 68  ms Final    Q-T Interval 06/29/2022 430  ms Final    QTC Calculation (Bezet) 06/29/2022 411  ms Final    Calculated P Axis 06/29/2022 33  degrees Final    Calculated R Axis 06/29/2022 16  degrees Final    Calculated T Axis 06/29/2022 46  degrees Final    Diagnosis 06/29/2022    Final                    Value:Sinus bradycardia  Otherwise normal ECG  No previous ECGs available  Confirmed by Judith Jesus ([id number]) on 6/29/2022 11:03:56 AM      Hemoglobin A1c 06/29/2022 5.0  4.0 - 5.6 % Final    Comment: NEW METHOD  PLEASE NOTE NEW REFERENCE RANGE  (NOTE)  HbA1C Interpretive Ranges  <5.7              Normal  5.7 - 6.4         Consider Prediabetes  >6.5              Consider Diabetes      Est. average glucose 06/29/2022 97  mg/dL Final    Special Requests: 06/29/2022 NO SPECIAL REQUESTS    Final    Culture result: 06/29/2022 MRSA NOT PRESENT    Final       Skin:     Denies open wounds, cuts, sores, rashes or other areas of concern in PAT assessment.         Simeon Lopez, NP

## 2022-07-01 ENCOUNTER — ANESTHESIA EVENT (OUTPATIENT)
Dept: SURGERY | Age: 57
DRG: 321 | End: 2022-07-01
Payer: COMMERCIAL

## 2022-07-05 ENCOUNTER — APPOINTMENT (OUTPATIENT)
Dept: GENERAL RADIOLOGY | Age: 57
DRG: 321 | End: 2022-07-05
Attending: ORTHOPAEDIC SURGERY
Payer: COMMERCIAL

## 2022-07-05 ENCOUNTER — ANESTHESIA (OUTPATIENT)
Dept: SURGERY | Age: 57
DRG: 321 | End: 2022-07-05
Payer: COMMERCIAL

## 2022-07-05 ENCOUNTER — HOSPITAL ENCOUNTER (INPATIENT)
Age: 57
LOS: 1 days | Discharge: HOME | DRG: 321 | End: 2022-07-06
Attending: ORTHOPAEDIC SURGERY | Admitting: ORTHOPAEDIC SURGERY
Payer: COMMERCIAL

## 2022-07-05 DIAGNOSIS — Z98.1 S/P CERVICAL SPINAL FUSION: Primary | ICD-10-CM

## 2022-07-05 PROBLEM — M54.12 CERVICAL RADICULOPATHY: Status: ACTIVE | Noted: 2022-07-05

## 2022-07-05 LAB
GLUCOSE BLD STRIP.AUTO-MCNC: 97 MG/DL (ref 65–117)
SERVICE CMNT-IMP: NORMAL

## 2022-07-05 PROCEDURE — 74011000250 HC RX REV CODE- 250: Performed by: NURSE ANESTHETIST, CERTIFIED REGISTERED

## 2022-07-05 PROCEDURE — 74011250636 HC RX REV CODE- 250/636: Performed by: ORTHOPAEDIC SURGERY

## 2022-07-05 PROCEDURE — 77030019908 HC STETH ESOPH SIMS -A: Performed by: ANESTHESIOLOGY

## 2022-07-05 PROCEDURE — C1713 ANCHOR/SCREW BN/BN,TIS/BN: HCPCS | Performed by: ORTHOPAEDIC SURGERY

## 2022-07-05 PROCEDURE — 74011250637 HC RX REV CODE- 250/637: Performed by: PHYSICIAN ASSISTANT

## 2022-07-05 PROCEDURE — 74011250637 HC RX REV CODE- 250/637: Performed by: STUDENT IN AN ORGANIZED HEALTH CARE EDUCATION/TRAINING PROGRAM

## 2022-07-05 PROCEDURE — G0378 HOSPITAL OBSERVATION PER HR: HCPCS

## 2022-07-05 PROCEDURE — 77030034475 HC MISC IMPL SPN: Performed by: ORTHOPAEDIC SURGERY

## 2022-07-05 PROCEDURE — C1889 IMPLANT/INSERT DEVICE, NOC: HCPCS | Performed by: ORTHOPAEDIC SURGERY

## 2022-07-05 PROCEDURE — 76010000174 HC OR TIME 3.5 TO 4 HR INTENSV-TIER 1: Performed by: ORTHOPAEDIC SURGERY

## 2022-07-05 PROCEDURE — 74011250636 HC RX REV CODE- 250/636: Performed by: STUDENT IN AN ORGANIZED HEALTH CARE EDUCATION/TRAINING PROGRAM

## 2022-07-05 PROCEDURE — 77030040922 HC BLNKT HYPOTHRM STRY -A

## 2022-07-05 PROCEDURE — 74011250636 HC RX REV CODE- 250/636: Performed by: ANESTHESIOLOGY

## 2022-07-05 PROCEDURE — 74011250636 HC RX REV CODE- 250/636: Performed by: PHYSICIAN ASSISTANT

## 2022-07-05 PROCEDURE — 77030013079 HC BLNKT BAIR HGGR 3M -A: Performed by: ANESTHESIOLOGY

## 2022-07-05 PROCEDURE — 82962 GLUCOSE BLOOD TEST: CPT

## 2022-07-05 PROCEDURE — 77030029099 HC BN WAX SSPC -A: Performed by: ORTHOPAEDIC SURGERY

## 2022-07-05 PROCEDURE — 74011250636 HC RX REV CODE- 250/636: Performed by: NURSE ANESTHETIST, CERTIFIED REGISTERED

## 2022-07-05 PROCEDURE — 74011000258 HC RX REV CODE- 258: Performed by: NURSE ANESTHETIST, CERTIFIED REGISTERED

## 2022-07-05 PROCEDURE — 74011250637 HC RX REV CODE- 250/637: Performed by: ORTHOPAEDIC SURGERY

## 2022-07-05 PROCEDURE — 76210000000 HC OR PH I REC 2 TO 2.5 HR: Performed by: ORTHOPAEDIC SURGERY

## 2022-07-05 PROCEDURE — 0RT30ZZ RESECTION OF CERVICAL VERTEBRAL DISC, OPEN APPROACH: ICD-10-PCS | Performed by: ORTHOPAEDIC SURGERY

## 2022-07-05 PROCEDURE — 77030010507 HC ADH SKN DERMBND J&J -B: Performed by: ORTHOPAEDIC SURGERY

## 2022-07-05 PROCEDURE — 76060000039 HC ANESTHESIA 4 TO 4.5 HR: Performed by: ORTHOPAEDIC SURGERY

## 2022-07-05 PROCEDURE — 77030014650 HC SEAL MTRX FLOSEL BAXT -C: Performed by: ORTHOPAEDIC SURGERY

## 2022-07-05 PROCEDURE — 74011250636 HC RX REV CODE- 250/636

## 2022-07-05 PROCEDURE — 77030031139 HC SUT VCRL2 J&J -A: Performed by: ORTHOPAEDIC SURGERY

## 2022-07-05 PROCEDURE — 0RG20A0 FUSION OF 2 OR MORE CERVICAL VERTEBRAL JOINTS WITH INTERBODY FUSION DEVICE, ANTERIOR APPROACH, ANTERIOR COLUMN, OPEN APPROACH: ICD-10-PCS | Performed by: ORTHOPAEDIC SURGERY

## 2022-07-05 PROCEDURE — 77030026438 HC STYL ET INTUB CARD -A: Performed by: ANESTHESIOLOGY

## 2022-07-05 PROCEDURE — 2709999900 HC NON-CHARGEABLE SUPPLY: Performed by: ORTHOPAEDIC SURGERY

## 2022-07-05 PROCEDURE — 77030040506 HC DRN WND MDII -A: Performed by: ORTHOPAEDIC SURGERY

## 2022-07-05 PROCEDURE — 77030008684 HC TU ET CUF COVD -B: Performed by: ANESTHESIOLOGY

## 2022-07-05 PROCEDURE — 77030002933 HC SUT MCRYL J&J -A: Performed by: ORTHOPAEDIC SURGERY

## 2022-07-05 PROCEDURE — L0172 CERV COL SR FOAM 2PC PRE OTS: HCPCS | Performed by: ORTHOPAEDIC SURGERY

## 2022-07-05 PROCEDURE — 72040 X-RAY EXAM NECK SPINE 2-3 VW: CPT

## 2022-07-05 PROCEDURE — 74011000250 HC RX REV CODE- 250: Performed by: ORTHOPAEDIC SURGERY

## 2022-07-05 DEVICE — FORTILINK-C TETRAFUSE 12X14 6MM, 6L
Type: IMPLANTABLE DEVICE | Site: SPINE CERVICAL | Status: FUNCTIONAL
Brand: FORTILINK-C

## 2022-07-05 DEVICE — CAGE SPNL W14XH7MM D12MM 6DEG ANT CERV INTBDY FUS LORD W/: Type: IMPLANTABLE DEVICE | Site: SPINE CERVICAL | Status: FUNCTIONAL

## 2022-07-05 DEVICE — FORTILINK-C TETRAFUSE 12X14 5MM, 6L
Type: IMPLANTABLE DEVICE | Site: SPINE CERVICAL | Status: FUNCTIONAL
Brand: FORTILINK-C

## 2022-07-05 DEVICE — I-FACTOR PUTTY, 1.0CC SYRINGE
Type: IMPLANTABLE DEVICE | Site: SPINE CERVICAL | Status: FUNCTIONAL
Brand: I-FACTOR PEPTIDE ENHANCED BONE GRAFT

## 2022-07-05 RX ORDER — DOCUSATE SODIUM 100 MG/1
100 CAPSULE, LIQUID FILLED ORAL
Status: DISCONTINUED | OUTPATIENT
Start: 2022-07-05 | End: 2022-07-06 | Stop reason: HOSPADM

## 2022-07-05 RX ORDER — MIDAZOLAM HYDROCHLORIDE 1 MG/ML
0.5 INJECTION, SOLUTION INTRAMUSCULAR; INTRAVENOUS
Status: DISCONTINUED | OUTPATIENT
Start: 2022-07-05 | End: 2022-07-05 | Stop reason: HOSPADM

## 2022-07-05 RX ORDER — DEXAMETHASONE SODIUM PHOSPHATE 4 MG/ML
INJECTION, SOLUTION INTRA-ARTICULAR; INTRALESIONAL; INTRAMUSCULAR; INTRAVENOUS; SOFT TISSUE AS NEEDED
Status: DISCONTINUED | OUTPATIENT
Start: 2022-07-05 | End: 2022-07-05 | Stop reason: HOSPADM

## 2022-07-05 RX ORDER — KETOROLAC TROMETHAMINE 30 MG/ML
15 INJECTION, SOLUTION INTRAMUSCULAR; INTRAVENOUS EVERY 6 HOURS
Status: COMPLETED | OUTPATIENT
Start: 2022-07-05 | End: 2022-07-06

## 2022-07-05 RX ORDER — SODIUM CHLORIDE 0.9 % (FLUSH) 0.9 %
5-40 SYRINGE (ML) INJECTION AS NEEDED
Status: DISCONTINUED | OUTPATIENT
Start: 2022-07-05 | End: 2022-07-05 | Stop reason: HOSPADM

## 2022-07-05 RX ORDER — SODIUM CHLORIDE, SODIUM LACTATE, POTASSIUM CHLORIDE, CALCIUM CHLORIDE 600; 310; 30; 20 MG/100ML; MG/100ML; MG/100ML; MG/100ML
INJECTION, SOLUTION INTRAVENOUS
Status: DISCONTINUED | OUTPATIENT
Start: 2022-07-05 | End: 2022-07-05 | Stop reason: HOSPADM

## 2022-07-05 RX ORDER — MIDAZOLAM HYDROCHLORIDE 1 MG/ML
1 INJECTION, SOLUTION INTRAMUSCULAR; INTRAVENOUS AS NEEDED
Status: DISCONTINUED | OUTPATIENT
Start: 2022-07-05 | End: 2022-07-05 | Stop reason: HOSPADM

## 2022-07-05 RX ORDER — LISINOPRIL 20 MG/1
40 TABLET ORAL
Status: DISCONTINUED | OUTPATIENT
Start: 2022-07-05 | End: 2022-07-06 | Stop reason: HOSPADM

## 2022-07-05 RX ORDER — ACETAMINOPHEN 325 MG/1
650 TABLET ORAL EVERY 6 HOURS
Status: DISCONTINUED | OUTPATIENT
Start: 2022-07-05 | End: 2022-07-06 | Stop reason: HOSPADM

## 2022-07-05 RX ORDER — LIDOCAINE HYDROCHLORIDE 10 MG/ML
0.1 INJECTION, SOLUTION EPIDURAL; INFILTRATION; INTRACAUDAL; PERINEURAL AS NEEDED
Status: DISCONTINUED | OUTPATIENT
Start: 2022-07-05 | End: 2022-07-05 | Stop reason: HOSPADM

## 2022-07-05 RX ORDER — MIDAZOLAM HYDROCHLORIDE 1 MG/ML
INJECTION, SOLUTION INTRAMUSCULAR; INTRAVENOUS AS NEEDED
Status: DISCONTINUED | OUTPATIENT
Start: 2022-07-05 | End: 2022-07-05 | Stop reason: HOSPADM

## 2022-07-05 RX ORDER — SODIUM CHLORIDE 0.9 % (FLUSH) 0.9 %
5-40 SYRINGE (ML) INJECTION AS NEEDED
Status: DISCONTINUED | OUTPATIENT
Start: 2022-07-05 | End: 2022-07-06 | Stop reason: HOSPADM

## 2022-07-05 RX ORDER — BUPROPION HYDROCHLORIDE 150 MG/1
300 TABLET, EXTENDED RELEASE ORAL DAILY
Status: DISCONTINUED | OUTPATIENT
Start: 2022-07-06 | End: 2022-07-06 | Stop reason: HOSPADM

## 2022-07-05 RX ORDER — OXYCODONE AND ACETAMINOPHEN 5; 325 MG/1; MG/1
1 TABLET ORAL AS NEEDED
Status: DISCONTINUED | OUTPATIENT
Start: 2022-07-05 | End: 2022-07-05 | Stop reason: HOSPADM

## 2022-07-05 RX ORDER — OXYCODONE HYDROCHLORIDE 5 MG/1
10 TABLET ORAL
Status: DISCONTINUED | OUTPATIENT
Start: 2022-07-05 | End: 2022-07-06 | Stop reason: HOSPADM

## 2022-07-05 RX ORDER — SODIUM CHLORIDE 0.9 % (FLUSH) 0.9 %
5-40 SYRINGE (ML) INJECTION EVERY 8 HOURS
Status: DISCONTINUED | OUTPATIENT
Start: 2022-07-05 | End: 2022-07-06 | Stop reason: HOSPADM

## 2022-07-05 RX ORDER — LIDOCAINE HYDROCHLORIDE ANHYDROUS AND DEXTROSE MONOHYDRATE .8; 5 G/100ML; G/100ML
INJECTION, SOLUTION INTRAVENOUS
Status: DISCONTINUED | OUTPATIENT
Start: 2022-07-05 | End: 2022-07-05 | Stop reason: HOSPADM

## 2022-07-05 RX ORDER — GABAPENTIN 100 MG/1
100 CAPSULE ORAL 3 TIMES DAILY
Status: DISCONTINUED | OUTPATIENT
Start: 2022-07-05 | End: 2022-07-06 | Stop reason: HOSPADM

## 2022-07-05 RX ORDER — ONDANSETRON 2 MG/ML
4 INJECTION INTRAMUSCULAR; INTRAVENOUS AS NEEDED
Status: DISCONTINUED | OUTPATIENT
Start: 2022-07-05 | End: 2022-07-05 | Stop reason: HOSPADM

## 2022-07-05 RX ORDER — ROCURONIUM BROMIDE 10 MG/ML
INJECTION, SOLUTION INTRAVENOUS AS NEEDED
Status: DISCONTINUED | OUTPATIENT
Start: 2022-07-05 | End: 2022-07-05 | Stop reason: HOSPADM

## 2022-07-05 RX ORDER — HYDROMORPHONE HYDROCHLORIDE 2 MG/ML
INJECTION, SOLUTION INTRAMUSCULAR; INTRAVENOUS; SUBCUTANEOUS AS NEEDED
Status: DISCONTINUED | OUTPATIENT
Start: 2022-07-05 | End: 2022-07-05 | Stop reason: HOSPADM

## 2022-07-05 RX ORDER — MORPHINE SULFATE 2 MG/ML
2 INJECTION, SOLUTION INTRAMUSCULAR; INTRAVENOUS
Status: DISCONTINUED | OUTPATIENT
Start: 2022-07-05 | End: 2022-07-05 | Stop reason: HOSPADM

## 2022-07-05 RX ORDER — HYDROMORPHONE HYDROCHLORIDE 1 MG/ML
0.2 INJECTION, SOLUTION INTRAMUSCULAR; INTRAVENOUS; SUBCUTANEOUS
Status: DISCONTINUED | OUTPATIENT
Start: 2022-07-05 | End: 2022-07-05 | Stop reason: HOSPADM

## 2022-07-05 RX ORDER — PHENYLEPHRINE HCL IN 0.9% NACL 0.4MG/10ML
SYRINGE (ML) INTRAVENOUS AS NEEDED
Status: DISCONTINUED | OUTPATIENT
Start: 2022-07-05 | End: 2022-07-05 | Stop reason: HOSPADM

## 2022-07-05 RX ORDER — SODIUM CHLORIDE 9 MG/ML
125 INJECTION, SOLUTION INTRAVENOUS CONTINUOUS
Status: DISPENSED | OUTPATIENT
Start: 2022-07-05 | End: 2022-07-06

## 2022-07-05 RX ORDER — SODIUM CHLORIDE 9 MG/ML
INJECTION, SOLUTION INTRAVENOUS
Status: DISCONTINUED | OUTPATIENT
Start: 2022-07-05 | End: 2022-07-05 | Stop reason: HOSPADM

## 2022-07-05 RX ORDER — FENTANYL CITRATE 50 UG/ML
50 INJECTION, SOLUTION INTRAMUSCULAR; INTRAVENOUS AS NEEDED
Status: DISCONTINUED | OUTPATIENT
Start: 2022-07-05 | End: 2022-07-05 | Stop reason: HOSPADM

## 2022-07-05 RX ORDER — SODIUM CHLORIDE 9 MG/ML
1000 INJECTION, SOLUTION INTRAVENOUS CONTINUOUS
Status: DISCONTINUED | OUTPATIENT
Start: 2022-07-05 | End: 2022-07-05 | Stop reason: HOSPADM

## 2022-07-05 RX ORDER — ROPIVACAINE HYDROCHLORIDE 5 MG/ML
30 INJECTION, SOLUTION EPIDURAL; INFILTRATION; PERINEURAL AS NEEDED
Status: DISCONTINUED | OUTPATIENT
Start: 2022-07-05 | End: 2022-07-05 | Stop reason: HOSPADM

## 2022-07-05 RX ORDER — SODIUM CHLORIDE 0.9 % (FLUSH) 0.9 %
5-40 SYRINGE (ML) INJECTION EVERY 8 HOURS
Status: DISCONTINUED | OUTPATIENT
Start: 2022-07-05 | End: 2022-07-05 | Stop reason: HOSPADM

## 2022-07-05 RX ORDER — PROPOFOL 10 MG/ML
INJECTION, EMULSION INTRAVENOUS
Status: DISCONTINUED | OUTPATIENT
Start: 2022-07-05 | End: 2022-07-05 | Stop reason: HOSPADM

## 2022-07-05 RX ORDER — LIDOCAINE HYDROCHLORIDE 20 MG/ML
INJECTION, SOLUTION EPIDURAL; INFILTRATION; INTRACAUDAL; PERINEURAL
Status: DISCONTINUED | OUTPATIENT
Start: 2022-07-05 | End: 2022-07-05 | Stop reason: HOSPADM

## 2022-07-05 RX ORDER — ACETAMINOPHEN 500 MG
1000 TABLET ORAL ONCE
Status: COMPLETED | OUTPATIENT
Start: 2022-07-05 | End: 2022-07-05

## 2022-07-05 RX ORDER — ONDANSETRON 2 MG/ML
INJECTION INTRAMUSCULAR; INTRAVENOUS AS NEEDED
Status: DISCONTINUED | OUTPATIENT
Start: 2022-07-05 | End: 2022-07-05 | Stop reason: HOSPADM

## 2022-07-05 RX ORDER — SODIUM CHLORIDE 9 MG/ML
125 INJECTION, SOLUTION INTRAVENOUS CONTINUOUS
Status: DISCONTINUED | OUTPATIENT
Start: 2022-07-05 | End: 2022-07-05 | Stop reason: HOSPADM

## 2022-07-05 RX ORDER — FENTANYL CITRATE 50 UG/ML
25 INJECTION, SOLUTION INTRAMUSCULAR; INTRAVENOUS
Status: COMPLETED | OUTPATIENT
Start: 2022-07-05 | End: 2022-07-05

## 2022-07-05 RX ORDER — SUCCINYLCHOLINE CHLORIDE 20 MG/ML
INJECTION INTRAMUSCULAR; INTRAVENOUS AS NEEDED
Status: DISCONTINUED | OUTPATIENT
Start: 2022-07-05 | End: 2022-07-05 | Stop reason: HOSPADM

## 2022-07-05 RX ORDER — SODIUM CHLORIDE, SODIUM LACTATE, POTASSIUM CHLORIDE, CALCIUM CHLORIDE 600; 310; 30; 20 MG/100ML; MG/100ML; MG/100ML; MG/100ML
125 INJECTION, SOLUTION INTRAVENOUS CONTINUOUS
Status: DISCONTINUED | OUTPATIENT
Start: 2022-07-05 | End: 2022-07-05 | Stop reason: HOSPADM

## 2022-07-05 RX ORDER — ACETAMINOPHEN 325 MG/1
650 TABLET ORAL EVERY 6 HOURS
Status: DISCONTINUED | OUTPATIENT
Start: 2022-07-05 | End: 2022-07-05

## 2022-07-05 RX ORDER — SODIUM CHLORIDE, SODIUM LACTATE, POTASSIUM CHLORIDE, CALCIUM CHLORIDE 600; 310; 30; 20 MG/100ML; MG/100ML; MG/100ML; MG/100ML
1000 INJECTION, SOLUTION INTRAVENOUS CONTINUOUS
Status: DISCONTINUED | OUTPATIENT
Start: 2022-07-05 | End: 2022-07-05 | Stop reason: HOSPADM

## 2022-07-05 RX ORDER — PROPOFOL 10 MG/ML
INJECTION, EMULSION INTRAVENOUS AS NEEDED
Status: DISCONTINUED | OUTPATIENT
Start: 2022-07-05 | End: 2022-07-05 | Stop reason: HOSPADM

## 2022-07-05 RX ORDER — DIPHENHYDRAMINE HYDROCHLORIDE 50 MG/ML
12.5 INJECTION, SOLUTION INTRAMUSCULAR; INTRAVENOUS AS NEEDED
Status: DISCONTINUED | OUTPATIENT
Start: 2022-07-05 | End: 2022-07-05 | Stop reason: HOSPADM

## 2022-07-05 RX ORDER — OXYCODONE HYDROCHLORIDE 5 MG/1
5 TABLET ORAL
Status: DISCONTINUED | OUTPATIENT
Start: 2022-07-05 | End: 2022-07-06 | Stop reason: HOSPADM

## 2022-07-05 RX ORDER — LIDOCAINE HYDROCHLORIDE 20 MG/ML
INJECTION, SOLUTION EPIDURAL; INFILTRATION; INTRACAUDAL; PERINEURAL AS NEEDED
Status: DISCONTINUED | OUTPATIENT
Start: 2022-07-05 | End: 2022-07-05 | Stop reason: HOSPADM

## 2022-07-05 RX ORDER — HYDRALAZINE HYDROCHLORIDE 20 MG/ML
INJECTION INTRAMUSCULAR; INTRAVENOUS
Status: COMPLETED
Start: 2022-07-05 | End: 2022-07-05

## 2022-07-05 RX ORDER — CLINDAMYCIN PHOSPHATE 900 MG/50ML
900 INJECTION INTRAVENOUS EVERY 8 HOURS
Status: COMPLETED | OUTPATIENT
Start: 2022-07-05 | End: 2022-07-06

## 2022-07-05 RX ORDER — FENTANYL CITRATE 50 UG/ML
INJECTION, SOLUTION INTRAMUSCULAR; INTRAVENOUS AS NEEDED
Status: DISCONTINUED | OUTPATIENT
Start: 2022-07-05 | End: 2022-07-05 | Stop reason: HOSPADM

## 2022-07-05 RX ORDER — TIZANIDINE 2 MG/1
2 TABLET ORAL
Status: DISCONTINUED | OUTPATIENT
Start: 2022-07-05 | End: 2022-07-06 | Stop reason: HOSPADM

## 2022-07-05 RX ORDER — HYDRALAZINE HYDROCHLORIDE 20 MG/ML
10 INJECTION INTRAMUSCULAR; INTRAVENOUS
Status: COMPLETED | OUTPATIENT
Start: 2022-07-05 | End: 2022-07-05

## 2022-07-05 RX ORDER — KETAMINE HYDROCHLORIDE 10 MG/ML
INJECTION, SOLUTION INTRAMUSCULAR; INTRAVENOUS AS NEEDED
Status: DISCONTINUED | OUTPATIENT
Start: 2022-07-05 | End: 2022-07-05 | Stop reason: HOSPADM

## 2022-07-05 RX ORDER — NALOXONE HYDROCHLORIDE 0.4 MG/ML
0.4 INJECTION, SOLUTION INTRAMUSCULAR; INTRAVENOUS; SUBCUTANEOUS AS NEEDED
Status: DISCONTINUED | OUTPATIENT
Start: 2022-07-05 | End: 2022-07-06 | Stop reason: HOSPADM

## 2022-07-05 RX ADMIN — HYDRALAZINE HYDROCHLORIDE 10 MG: 20 INJECTION INTRAMUSCULAR; INTRAVENOUS at 12:57

## 2022-07-05 RX ADMIN — MIDAZOLAM HYDROCHLORIDE 2 MG: 1 INJECTION, SOLUTION INTRAMUSCULAR; INTRAVENOUS at 07:35

## 2022-07-05 RX ADMIN — PHENYLEPHRINE HYDROCHLORIDE 25 MCG/MIN: 10 INJECTION INTRAVENOUS at 08:59

## 2022-07-05 RX ADMIN — SUCCINYLCHOLINE CHLORIDE 160 MG: 20 INJECTION, SOLUTION INTRAMUSCULAR; INTRAVENOUS at 07:42

## 2022-07-05 RX ADMIN — ACETAMINOPHEN 1000 MG: 500 TABLET ORAL at 06:54

## 2022-07-05 RX ADMIN — HYDROMORPHONE HYDROCHLORIDE 0.2 MG: 1 INJECTION, SOLUTION INTRAMUSCULAR; INTRAVENOUS; SUBCUTANEOUS at 13:15

## 2022-07-05 RX ADMIN — ROCURONIUM BROMIDE 10 MG: 10 SOLUTION INTRAVENOUS at 07:42

## 2022-07-05 RX ADMIN — OXYCODONE 10 MG: 5 TABLET ORAL at 21:39

## 2022-07-05 RX ADMIN — HYDROMORPHONE HYDROCHLORIDE 0.5 MG: 2 INJECTION, SOLUTION INTRAMUSCULAR; INTRAVENOUS; SUBCUTANEOUS at 11:23

## 2022-07-05 RX ADMIN — ONDANSETRON HYDROCHLORIDE 4 MG: 2 SOLUTION INTRAMUSCULAR; INTRAVENOUS at 11:57

## 2022-07-05 RX ADMIN — GABAPENTIN 100 MG: 100 CAPSULE ORAL at 15:02

## 2022-07-05 RX ADMIN — CLINDAMYCIN PHOSPHATE 900 MG: 900 INJECTION, SOLUTION INTRAVENOUS at 16:00

## 2022-07-05 RX ADMIN — FENTANYL CITRATE 25 MCG: 50 INJECTION, SOLUTION INTRAMUSCULAR; INTRAVENOUS at 11:59

## 2022-07-05 RX ADMIN — OXYCODONE 10 MG: 5 TABLET ORAL at 18:22

## 2022-07-05 RX ADMIN — DOCUSATE SODIUM 100 MG: 100 CAPSULE, LIQUID FILLED ORAL at 21:29

## 2022-07-05 RX ADMIN — FENTANYL CITRATE 25 MCG: 50 INJECTION, SOLUTION INTRAMUSCULAR; INTRAVENOUS at 12:15

## 2022-07-05 RX ADMIN — HYDRALAZINE HYDROCHLORIDE 10 MG: 20 INJECTION INTRAMUSCULAR; INTRAVENOUS at 13:10

## 2022-07-05 RX ADMIN — Medication 25 MG: at 07:42

## 2022-07-05 RX ADMIN — KETOROLAC TROMETHAMINE 15 MG: 30 INJECTION, SOLUTION INTRAMUSCULAR; INTRAVENOUS at 20:06

## 2022-07-05 RX ADMIN — ONDANSETRON HYDROCHLORIDE 4 MG: 2 INJECTION, SOLUTION INTRAMUSCULAR; INTRAVENOUS at 10:51

## 2022-07-05 RX ADMIN — LIDOCAINE HYDROCHLORIDE 17 ML/HR: 20 INJECTION, SOLUTION EPIDURAL; INFILTRATION; INTRACAUDAL; PERINEURAL at 08:15

## 2022-07-05 RX ADMIN — FENTANYL CITRATE 50 MCG: 50 INJECTION, SOLUTION INTRAMUSCULAR; INTRAVENOUS at 08:46

## 2022-07-05 RX ADMIN — Medication 80 MCG: at 09:02

## 2022-07-05 RX ADMIN — HYDROMORPHONE HYDROCHLORIDE 0.2 MG: 1 INJECTION, SOLUTION INTRAMUSCULAR; INTRAVENOUS; SUBCUTANEOUS at 12:25

## 2022-07-05 RX ADMIN — LIDOCAINE HYDROCHLORIDE 2 MG/KG/HR: 8 INJECTION, SOLUTION INTRAVENOUS at 08:11

## 2022-07-05 RX ADMIN — FENTANYL CITRATE 50 MCG: 50 INJECTION, SOLUTION INTRAMUSCULAR; INTRAVENOUS at 09:18

## 2022-07-05 RX ADMIN — FENTANYL CITRATE 100 MCG: 50 INJECTION, SOLUTION INTRAMUSCULAR; INTRAVENOUS at 07:43

## 2022-07-05 RX ADMIN — SODIUM CHLORIDE, PRESERVATIVE FREE 10 ML: 5 INJECTION INTRAVENOUS at 22:00

## 2022-07-05 RX ADMIN — Medication 10 MG/HR: at 08:15

## 2022-07-05 RX ADMIN — ACETAMINOPHEN 650 MG: 325 TABLET ORAL at 21:29

## 2022-07-05 RX ADMIN — HYDROMORPHONE HYDROCHLORIDE 0.2 MG: 1 INJECTION, SOLUTION INTRAMUSCULAR; INTRAVENOUS; SUBCUTANEOUS at 12:08

## 2022-07-05 RX ADMIN — LIDOCAINE HYDROCHLORIDE 60 MG: 20 INJECTION, SOLUTION EPIDURAL; INFILTRATION; INTRACAUDAL; PERINEURAL at 07:42

## 2022-07-05 RX ADMIN — PROPOFOL 100 MCG/KG/MIN: 10 INJECTION, EMULSION INTRAVENOUS at 08:10

## 2022-07-05 RX ADMIN — Medication 80 MCG: at 09:08

## 2022-07-05 RX ADMIN — HYDROMORPHONE HYDROCHLORIDE 0.2 MG: 1 INJECTION, SOLUTION INTRAMUSCULAR; INTRAVENOUS; SUBCUTANEOUS at 12:55

## 2022-07-05 RX ADMIN — GABAPENTIN 100 MG: 100 CAPSULE ORAL at 21:29

## 2022-07-05 RX ADMIN — FENTANYL CITRATE 50 MCG: 50 INJECTION, SOLUTION INTRAMUSCULAR; INTRAVENOUS at 10:50

## 2022-07-05 RX ADMIN — HYDROMORPHONE HYDROCHLORIDE 0.5 MG: 2 INJECTION, SOLUTION INTRAMUSCULAR; INTRAVENOUS; SUBCUTANEOUS at 11:36

## 2022-07-05 RX ADMIN — MIDAZOLAM HYDROCHLORIDE 3 MG: 1 INJECTION, SOLUTION INTRAMUSCULAR; INTRAVENOUS at 07:32

## 2022-07-05 RX ADMIN — SODIUM CHLORIDE 125 ML/HR: 9 INJECTION, SOLUTION INTRAVENOUS at 13:37

## 2022-07-05 RX ADMIN — SODIUM CHLORIDE, POTASSIUM CHLORIDE, SODIUM LACTATE AND CALCIUM CHLORIDE 125 ML/HR: 600; 310; 30; 20 INJECTION, SOLUTION INTRAVENOUS at 06:51

## 2022-07-05 RX ADMIN — LISINOPRIL 40 MG: 20 TABLET ORAL at 21:29

## 2022-07-05 RX ADMIN — DEXAMETHASONE SODIUM PHOSPHATE 8 MG: 4 INJECTION, SOLUTION INTRAMUSCULAR; INTRAVENOUS at 08:04

## 2022-07-05 RX ADMIN — OXYCODONE 10 MG: 5 TABLET ORAL at 15:02

## 2022-07-05 RX ADMIN — PROPOFOL 100 MG: 10 INJECTION, EMULSION INTRAVENOUS at 09:17

## 2022-07-05 RX ADMIN — SODIUM CHLORIDE 900 MG: 9 INJECTION, SOLUTION INTRAVENOUS at 08:21

## 2022-07-05 RX ADMIN — FENTANYL CITRATE 25 MCG: 50 INJECTION, SOLUTION INTRAMUSCULAR; INTRAVENOUS at 12:05

## 2022-07-05 RX ADMIN — SODIUM CHLORIDE, POTASSIUM CHLORIDE, SODIUM LACTATE AND CALCIUM CHLORIDE: 600; 310; 30; 20 INJECTION, SOLUTION INTRAVENOUS at 07:21

## 2022-07-05 RX ADMIN — HYDROMORPHONE HYDROCHLORIDE 0.2 MG: 1 INJECTION, SOLUTION INTRAMUSCULAR; INTRAVENOUS; SUBCUTANEOUS at 12:40

## 2022-07-05 RX ADMIN — SODIUM CHLORIDE: 900 INJECTION, SOLUTION INTRAVENOUS at 07:51

## 2022-07-05 RX ADMIN — FENTANYL CITRATE 25 MCG: 50 INJECTION, SOLUTION INTRAMUSCULAR; INTRAVENOUS at 12:20

## 2022-07-05 RX ADMIN — PROPOFOL 200 MG: 10 INJECTION, EMULSION INTRAVENOUS at 07:42

## 2022-07-05 NOTE — PROGRESS NOTES
Bedside and Verbal shift change report given to Radha Capone RN (oncoming nurse) by Gerardo Gudino RN (offgoing nurse). Report included the following information SBAR.

## 2022-07-05 NOTE — PROGRESS NOTES
Primary Nurse Justice Patel RN and Mynor Levy RN performed a dual skin assessment on this patient No impairment noted  Mahamed score is 18.

## 2022-07-05 NOTE — ANESTHESIA PREPROCEDURE EVALUATION
Relevant Problems   NEUROLOGY   (+) Nightmare      CARDIOVASCULAR   (+) Essential hypertension      GASTROINTESTINAL   (+) Chronic hepatitis C without hepatic coma (HCC)       Anesthetic History   No history of anesthetic complications            Review of Systems / Medical History  Patient summary reviewed, nursing notes reviewed and pertinent labs reviewed    Pulmonary            Asthma        Neuro/Psych         Psychiatric history    Comments: Chronic pain Cardiovascular    Hypertension                   GI/Hepatic/Renal       Hepatitis: type C         Endo/Other        Arthritis     Other Findings              Physical Exam    Airway  Mallampati: II  TM Distance: 4 - 6 cm  Neck ROM: decreased range of motion   Mouth opening: Normal     Cardiovascular    Rhythm: regular  Rate: normal         Dental    Dentition: Poor dentition and Upper partial plate     Pulmonary  Breath sounds clear to auscultation               Abdominal  GI exam deferred       Other Findings            Anesthetic Plan    ASA: 3  Anesthesia type: general          Induction: Intravenous  Anesthetic plan and risks discussed with: Patient      H/o of heroin abuse. Currently on naltrexone treatment with addiction provider. Last took naltrexone PO 7 days ago, last took her IV naltrexone on 6/6/22. Her addiction provider is in communication with Dr. Gulshan Hernandez team and they have a post-op pain plan.  Will utilize multimodal analgesia to minimize need for narcotics

## 2022-07-05 NOTE — PROGRESS NOTES
Problem: Falls - Risk of  Goal: *Absence of Falls  Description: Document [place] Flow Fall Risk and appropriate interventions in the flowsheet.   Outcome: Progressing Towards Goal  Note: Fall Risk Interventions:            Medication Interventions: Teach patient to arise slowly,Patient to call before getting OOB

## 2022-07-05 NOTE — BRIEF OP NOTE
Brief Postoperative Note    Patient: Paty Panchal  YOB: 1965  MRN: [id number]    Date of Procedure: 7/5/2022     Pre-Op Diagnosis: CERVICAL RADICULOPATHY    Post-Op Diagnosis: Same as preoperative diagnosis. Procedure(s):  C4-C7 ANTERIOR CERVICAL DISCECTOMY AND FUSION    Surgeon(s):  Javier Stevenson MD    Surgical Assistant: Surg Asst-1: Gerhardt Joiner    Anesthesia: General     Estimated Blood Loss (mL): less than 50     Complications: None    Specimens: * No specimens in log *     Implants:   Implant Name Type Inv.  Item Serial No.  Lot No. LRB No. Used Action   iFactor Bone Graft    NA  [id number] N/A 1 Implanted   CAGE SPNL [id number] D12MM 6DEG ANT CERV INTBDY FUS LORD W/ - SNA  CAGE SPNL [id number] D12MM 6DEG ANT CERV INTBDY FUS LORD W/ NA [employer] [id number] N/A 1 Implanted   CAGE SPNL [id number] D12MM 6DEG ANT CERV INTBDY FUS LORD W - SNA  CAGE SPNL [id number] D12MM 6DEG ANT CERV INTBDY FUS LORD W NA [employer] [id number] N/A 1 Implanted   CAGE SPNL [id number] D12MM 6DEG ANT CERV INTBDY FUS LORD W - SNA  CAGE SPNL [id number] D12MM 6DEG ANT CERV INTBDY FUS LORD W NA [employer] [id number] N/A 1 Implanted   45mm Pickerel 3 Level Plate   NA  NA N/A 1 Implanted   4x14mm Variable Screw    NA  NA N/A 8 Implanted       Drains:   Charlie-Ramires Drain 07/05/22 Left Neck (Active)       Findings: See op report    Electronically Signed by Vanessa Wadsworth MD on 7/5/2022 at 11:04 AM

## 2022-07-05 NOTE — PROGRESS NOTES
Physical Therapy:     Orders received and chart reviewed. Patient is POD #0 s/p C4-C7 ACDF procedure. PT evaluation tomorrow on POD #1 per protocol. Recommend OOB to chair three times a day for meals as able and medically stable, position change every 30 minutes, log roll, and BLT precaution maintenance.      Thank you,    Chavez Goff, PT, DPT

## 2022-07-05 NOTE — PERIOP NOTES
Floseal Hemostatic Matrix 10mL   [id number]  REF# [id number]  EXP# [id number]    [facility]  XSQ#[id number]  [facility]#[id number]

## 2022-07-05 NOTE — PROGRESS NOTES
Spine Surgery Progress Note    Admit Date: 7/5/2022   LOS: 0 days      Daily Progress Note: 7/5/2022    POD:Day of Surgery    S/P: Procedure(s):  C4-C7 ANTERIOR CERVICAL DISCECTOMY AND FUSION    Visit Vitals  BP (!) 165/85 (BP 1 Location: Left arm, BP Patient Position: At rest)   Pulse 98   Temp 97.5 °F (36.4 °C)   Resp 18   Ht 5' 2\" (1.575 m)   Wt 67.9 kg (149 lb 11.1 oz)   SpO2 100%   BMI 27.38 kg/m²      Lab Results   Component Value Date/Time    HGB 14.6 06/29/2022 08:23 AM    INR 0.9 06/29/2022 08:23 AM     Patient doing well overall. No nausea or vomiting. Complaints of posterior neck pain. Sore throat. Swallowing without issue. Pt on vivitrol through her addiction medicine specialist.  Has not voided since surgery. Denies nausea, vomiting, fever, chills, chest pain, dyspnea, headache. Calves soft/NTTP Bilaterally. Moving UE & LE well. Neurocirculatory exam WNL. Motor and sensation intact. Incision OK; no drainage. Dressing clean and dry. MARJ drain intact    Plan:  -Pain control - scheduled tylenol, toradol, neurontin; PRN oxycodone, zanaflex  -PT/OT; in rigid collar  -Bladder checks  -ADAT  -Colace daily  -Cont home meds; vivitrol on hold; Dr. Pantera Gonzalez and Haley Mariee NP have been in communication  -Monitor drain output; d/c when <30mL over 8 hours  -Daily dressing changes to incision  -CM consult for [facility]     Discharge pending.    Plan d/w [facility]

## 2022-07-05 NOTE — PROGRESS NOTES
Transition of Care Plan   RUR- Observation    DISPOSITION: The disposition plan is home with family assistance; pending medical progression  o OP Pharmacy:  [facility], [address] [facility].  F/U with PCP/Specialist     Transport: [facility] [phone]    Reason for Admission:  Cervical radiculopathy     Plan for utilizing home health:      PT/OT recs pending     PCP: First and Last name:  Ant Marino MD     Name of Practice:    Are you a current patient: Yes/No: Yes    Approximate date of last visit: Last Week    Can you participate in a virtual visit with your PCP:                     Current Advanced Directive/Advance Care Plan: No Order      Healthcare Decision Maker:   Click here to complete [address] including selection of the Healthcare Decision Maker Relationship (ie \"Primary\")           Transition of Care Plan: This cm conducted the initial evaluation with the pt. The pt confirmed her demographics and insurance provider. The pt reported that upon discharge she would like her scripts to be sent to: [facility], [facility]. She reported that she would be transitioning to her sister's home upon discharge @[id number], [address] [place]. She inquired about obtaining a bone stimulator prior to discharge. This cm informed her that, that would need to be ordered through a specialist. She also inquired about obtaining a wedge pillow prior to discharge; this cm informed her that her insurance provider would not cover the product and she would have to purchase one over the counter. She reported that she would contact [facility] care coordinator [facility] [phone] to determine if they could provide assistance. This cm informed her that she has a pending consult for PT/OT, this cm informed her that following their evaluation with determine the needed DME. This cm will continue to follow for HO needs. Care Management Interventions  PCP Verified by CM: Yes  Mode of Transport at Discharge:  Other (see comment)  Transition of Care Consult (CM Consult): Discharge Planning  MyChart Signup: Yes  Discharge Durable Medical Equipment: No  Physical Therapy Consult: Yes  Occupational Therapy Consult: Yes  Speech Therapy Consult: No  Support Systems: Other Family Member(s)  Confirm Follow Up Transport: Family  The Patient and/or Patient Representative was Provided with a Choice of Provider and Agrees with the Discharge Plan?: Yes  Freedom of Choice List was Provided with Basic Dialogue that Supports the Patient's Individualized Plan of Care/Goals, Treatment Preferences and Shares the Quality Data Associated with the Providers?: Yes  Discharge Location  Patient Expects to be Discharged to[de-identified] Home  CM: [address] [place],   [phone]

## 2022-07-05 NOTE — PROGRESS NOTES
Occupational Therapy   Orders received, chart review completed. Note patient POD #0 s/p C4-C7 ANTERIOR CERVICAL DISCECTOMY AND FUSION. OT will follow up tomorrow for evaluation. Recommend OOB to chair three times a day for meals, self-completion of ADLs as able and medically stable. Thank you.

## 2022-07-05 NOTE — ROUTINE PROCESS
Patient: Erendira Salgado MRN: [id number]  SSN: [id number]   YOB: 1965  Age: 62 y.o. Sex: female     Patient is status post Procedure(s):  C4-C7 ANTERIOR CERVICAL DISCECTOMY AND FUSION.     Surgeon(s) and Role:     Yarely Wood MD - Primary    Local/Dose/Irrigation:  SEE MAR                  Peripheral IV 07/05/22 Left Hand (Active)          Charlie-Ramires Drain 07/05/22 Left Neck (Active)      Airway - Endotracheal Tube 07/05/22 Oral (Active)                   Dressing/Packing:  Incision 07/05/22 Neck-Dressing/Treatment: Steri-strips;Surgical glue (07/05/22 1107)    Splint/Cast:  ]    Other: Vincent Johnson

## 2022-07-05 NOTE — PERIOP NOTES
TRANSFER - OUT REPORT:    Verbal report given to Marisol Lucas (name) on Bre Spencer  being transferred to [facility](unit) for routine post - op       Report consisted of patients Situation, Background, Assessment and   Recommendations(SBAR). Time Pre op antibiotic given:8;21 Cleocin  Anesthesia Stop time: 11;42  Wagner Present on Transfer to floor:no  Order for Wagner on Chart:no  Discharge Prescriptions with Chart:no    Information from the following report(s) SBAR, Kardex, OR Summary, Procedure Summary, Intake/Output, MAR, Recent Results, Med Rec Status and Cardiac Rhythm SR was reviewed with the receiving nurse. Opportunity for questions and clarification was provided. Is the patient on 02? NO       L/Min        Other     Is the patient on a monitor? NO    Is the nurse transporting with the patient? NO    Surgical Waiting Area notified of patient's transfer from PACU? YES yes      The following personal items collected during your admission accompanied patient upon transfer:   Dental Appliance: Dental Appliances: None  Vision: Visual Aid: Glasses  Hearing Aid:    Jewelry: Jewelry: None  Clothing: Clothing:  (glasses and bag of clothing returned to pt.)  Other Valuables:  Other Valuables: Eyeglasses (Glasses to [employer])  Valuables sent to safe:

## 2022-07-06 VITALS
SYSTOLIC BLOOD PRESSURE: 151 MMHG | TEMPERATURE: 97.9 F | HEART RATE: 91 BPM | DIASTOLIC BLOOD PRESSURE: 87 MMHG | WEIGHT: 149.69 LBS | RESPIRATION RATE: 16 BRPM | OXYGEN SATURATION: 96 % | BODY MASS INDEX: 27.55 KG/M2 | HEIGHT: 62 IN

## 2022-07-06 PROCEDURE — 74011250637 HC RX REV CODE- 250/637: Performed by: PHYSICIAN ASSISTANT

## 2022-07-06 PROCEDURE — 74011250637 HC RX REV CODE- 250/637: Performed by: ORTHOPAEDIC SURGERY

## 2022-07-06 PROCEDURE — G0378 HOSPITAL OBSERVATION PER HR: HCPCS

## 2022-07-06 PROCEDURE — 97116 GAIT TRAINING THERAPY: CPT | Performed by: PHYSICAL THERAPIST

## 2022-07-06 PROCEDURE — 74011000250 HC RX REV CODE- 250: Performed by: ORTHOPAEDIC SURGERY

## 2022-07-06 PROCEDURE — 74011250636 HC RX REV CODE- 250/636: Performed by: PHYSICIAN ASSISTANT

## 2022-07-06 PROCEDURE — 97165 OT EVAL LOW COMPLEX 30 MIN: CPT

## 2022-07-06 PROCEDURE — 74011250636 HC RX REV CODE- 250/636: Performed by: ORTHOPAEDIC SURGERY

## 2022-07-06 PROCEDURE — 65270000029 HC RM PRIVATE

## 2022-07-06 PROCEDURE — 97161 PT EVAL LOW COMPLEX 20 MIN: CPT | Performed by: PHYSICAL THERAPIST

## 2022-07-06 RX ORDER — POLYETHYLENE GLYCOL 3350 17 G/17G
17 POWDER, FOR SOLUTION ORAL
Status: COMPLETED | OUTPATIENT
Start: 2022-07-06 | End: 2022-07-06

## 2022-07-06 RX ORDER — OXYCODONE AND ACETAMINOPHEN 5; 325 MG/1; MG/1
1 TABLET ORAL
Qty: 42 TABLET | Refills: 0 | Status: SHIPPED | OUTPATIENT
Start: 2022-07-06 | End: 2022-07-06 | Stop reason: SDUPTHER

## 2022-07-06 RX ORDER — OXYCODONE AND ACETAMINOPHEN 5; 325 MG/1; MG/1
1 TABLET ORAL
Qty: 42 TABLET | Refills: 0 | Status: SHIPPED | OUTPATIENT
Start: 2022-07-06 | End: 2022-07-13

## 2022-07-06 RX ORDER — GABAPENTIN 300 MG/1
300 CAPSULE ORAL 3 TIMES DAILY
Qty: 21 CAPSULE | Refills: 0 | Status: SHIPPED | OUTPATIENT
Start: 2022-07-06 | End: 2022-07-06 | Stop reason: SDUPTHER

## 2022-07-06 RX ORDER — GABAPENTIN 300 MG/1
300 CAPSULE ORAL 3 TIMES DAILY
Qty: 21 CAPSULE | Refills: 0 | Status: SHIPPED | OUTPATIENT
Start: 2022-07-06 | End: 2022-07-13

## 2022-07-06 RX ADMIN — SODIUM CHLORIDE, PRESERVATIVE FREE 10 ML: 5 INJECTION INTRAVENOUS at 07:35

## 2022-07-06 RX ADMIN — BUPROPION HYDROCHLORIDE 300 MG: 150 TABLET, EXTENDED RELEASE ORAL at 09:00

## 2022-07-06 RX ADMIN — CLINDAMYCIN PHOSPHATE 900 MG: 900 INJECTION, SOLUTION INTRAVENOUS at 00:06

## 2022-07-06 RX ADMIN — SODIUM CHLORIDE, PRESERVATIVE FREE 10 ML: 5 INJECTION INTRAVENOUS at 13:05

## 2022-07-06 RX ADMIN — POLYETHYLENE GLYCOL 3350 17 G: 17 POWDER, FOR SOLUTION ORAL at 11:18

## 2022-07-06 RX ADMIN — OXYCODONE 10 MG: 5 TABLET ORAL at 00:08

## 2022-07-06 RX ADMIN — OXYCODONE 10 MG: 5 TABLET ORAL at 13:06

## 2022-07-06 RX ADMIN — ACETAMINOPHEN 650 MG: 325 TABLET ORAL at 09:00

## 2022-07-06 RX ADMIN — KETOROLAC TROMETHAMINE 15 MG: 30 INJECTION, SOLUTION INTRAMUSCULAR; INTRAVENOUS at 02:41

## 2022-07-06 RX ADMIN — GABAPENTIN 100 MG: 100 CAPSULE ORAL at 09:00

## 2022-07-06 RX ADMIN — KETOROLAC TROMETHAMINE 15 MG: 30 INJECTION, SOLUTION INTRAMUSCULAR; INTRAVENOUS at 07:35

## 2022-07-06 RX ADMIN — KETOROLAC TROMETHAMINE 15 MG: 30 INJECTION, SOLUTION INTRAMUSCULAR; INTRAVENOUS at 13:05

## 2022-07-06 RX ADMIN — OXYCODONE 10 MG: 5 TABLET ORAL at 09:00

## 2022-07-06 RX ADMIN — OXYCODONE 10 MG: 5 TABLET ORAL at 05:37

## 2022-07-06 NOTE — PROGRESS NOTES
PHYSICAL THERAPY EVALUATION/DISCHARGE  Patient: Teena Cabrera (82 y.o. female)  Date: 2022  Primary Diagnosis: Cervical radiculopathy [M54.12]  Procedure(s) (LRB):  C4-C7 ANTERIOR CERVICAL DISCECTOMY AND FUSION (N/A) 1 Day Post-Op   Precautions:   Back,Fall      ASSESSMENT  Based on the objective data described below, the patient presents with near baseline level of function at this time. Patient is able to ambulate without any difficulty and navigate stairs without difficulty. Educated on back precautions and is compliant with BLT for c-spine. Patient has met all goals and is safe to DC from a mobility standpoint. Patient is cleared for discharge from PT standpoint:  YES [x]     NO []       Other factors to consider for discharge: none     Further skilled acute physical therapy is not indicated at this time. PLAN :  Recommendation for discharge: (in order for the patient to meet his/her long term goals)  No skilled physical therapy/ follow up rehabilitation needs identified at this time. IF patient discharges home will need the following DME: none       SUBJECTIVE:   Patient stated I feel good.     OBJECTIVE DATA SUMMARY:   HISTORY:    Past Medical History:   Diagnosis Date    Arthritis     Asthma     Cancer [address] [address]     SQUAMOUS CELL ON NOSE    Chronic pain     BACK    Hypertension     Liver disease     HEP C- HAD TREATMENT    Menopause     Psychiatric disorder     ANXIETY AND DEPRESSION     Past Surgical History:   Procedure Laterality Date    HX  SECTION      X2    HX OTHER SURGICAL      DOG BITES RIGHT ARM/HAND PLATES    HX WISDOM TEETH EXTRACTION         Prior level of function: Independent with mobility at baseline  Personal factors and/or comorbidities impacting plan of care:     Home Situation  Home Environment: Private residence  # Steps to Enter: 0  One/Two Story Residence: One story  Living Alone: Yes  Support Systems: Other Family Member(s)  Patient Expects to be Discharged to[de-identified] Home  Current DME Used/Available at Home: None  Tub or Shower Type: Tub/Shower combination    EXAMINATION/PRESENTATION/DECISION MAKING:   Critical Behavior:  Neurologic State: Alert  Orientation Level: Oriented X4  Cognition: Appropriate decision making  Safety/Judgement: Awareness of environment    Range Of Motion:  AROM: Within functional limits                       Strength:    Strength: Generally decreased, functional                    Tone & Sensation:   Tone: Normal              Sensation: Intact               Coordination:  Coordination: Within functional limits  Vision:      Functional Mobility:  Bed Mobility:     Supine to Sit: Modified independent; Additional time  Sit to Supine: Modified independent; Additional time     Transfers:  Sit to Stand: Independent  Stand to Sit: Independent        Bed to Chair: Independent              Balance:   Sitting: Intact  Standing: Intact  Ambulation/Gait Training:  Distance (ft): 100 Feet (ft)  Assistive Device: Gait belt  Ambulation - Level of Assistance: Independent     Gait Description (WDL): Exceptions to WDL           Base of Support: Narrowed     Speed/Brenda: Slow           Stairs:  Number of Stairs Trained: 12  Stairs - Level of Assistance: Independent   Rail Use: Left       Pain Rating:  Reports mild pain but does not rate    Activity Tolerance:   Good      After treatment patient left in no apparent distress:   Supine in bed, Call bell within reach and Caregiver / family present    COMMUNICATION/EDUCATION:   The patients plan of care was discussed with: Physical therapist, Occupational therapist and Registered nurse. Fall prevention education was provided and the patient/caregiver indicated understanding., Patient/family have participated as able in goal setting and plan of care. and Patient/family agree to work toward stated goals and plan of care.     Thank you for this referral.  Lora Chaudhary, PT, DPT   Time Calculation: 14 mins

## 2022-07-06 NOTE — PROGRESS NOTES
Spine Surgery Progress Note  Marky Buckner PA-C    Admit Date: 2022   LOS: 0 days      Daily Progress Note: 2022    POD:1 Day Post-Op    S/P: Procedure(s):  C4-C7 ANTERIOR CERVICAL DISCECTOMY AND FUSION    Subjective:     Patient is doing well on POD#1 after C4-7 ACDF surgery. Pain is well-controlled. Patient is swallowing, ambulating, and voiding without issue. Her only complaint is a sore throat. RUE N/T is improved. She denies chest pain, nausea, vomiting, difficulty swallowing, headache, and dyspnea. Pt resting comfortably in bed. Objective:     Vital signs  Temp (24hrs), Av.8 °F (36.6 °C), Min:97.2 °F (36.2 °C), Max:98.5 °F (36.9 °C)   No intake/output data recorded.  1901 -  0700  In: 2300 [I.V.:2300]  Out: 1215 [Urine:1100; Drains:65]    Visit Vitals  BP (!) 151/87 (BP 1 Location: Left upper arm, BP Patient Position: At rest;Lying)   Pulse 91   Temp 97.9 °F (36.6 °C)   Resp 16   Ht 5' 2\" (1.575 m)   Wt 67.9 kg (149 lb 11.1 oz)   SpO2 96%   BMI 27.38 kg/m²    O2 Flow Rate (L/min): 2 l/min O2 Device: None (Room air)     Output (mL)  Urine Voided: 450 ml (22 1337)  Last Bowel Movement Date: 22 (22)     Pain control  Pain Assessment  Pain Scale 1: Numeric (0 - 10)  Pain Intensity 1: 8  Pain Onset 1: post op  Pain Location 1: Neck  Pain Orientation 1: Right  Pain Description 1: Aching  Pain Intervention(s) 1: Medication (see MAR)    PT/OT  Gait     Gait  Base of Support: Narrowed  Speed/Brenda: Slow  Ambulation - Level of Assistance: Independent  Distance (ft): 100 Feet (ft)  Assistive Device: Gait belt  Rail Use: Left   Stairs - Level of Assistance: Independent  Number of Stairs Trained: 12        Physical Exam:  Gen: No acute distress. Neuro: A&Ox3. Follows commands. Speech clear. Affect normal.  PERRL. EOMI.   [place]. Strength 5/5 in UE and LE BL. Sensation intact. Gait deferred. Calves soft and supple;  No pain with passive stretch  Skin: Incision C/D/I    24 hour results:    No results found for this or any previous visit (from the past 24 hour(s)). Assessment:     Active Problems:    Cervical radiculopathy (7/5/2022)      Plan:     s/p C4-7 ACDF  -PT/OT - in rigid collar    -Pain control - scheduled tylenol, neurontin, toradol; prn oxycodone, zanaflex   -Drain accidentally d/c'd overnight   -ADAT   -Colace & miralax   -Nayely-op cleocin per Dr Patricia Patterson for discharge:     [x] Vital Signs stable    [x] + Voiding    [x] Wound intact, drainage minimal    [x] Tolerating PO intake     [x] Cleared by PT (OT if applicable)    [x] Adequate pain control on oral medication alone       Activity: up ad bradly  DVT ppx: SCDs  Dispo: home today    Plan d/w Dr. Ruma Madrid on the phone with patient's addiction medicine specialist who is on board with discharge plan of 1 week of percocet and gabapentin. She will see patient for a virtual appointment tomorrow.       SHA Vasquez

## 2022-07-06 NOTE — PROGRESS NOTES
Spiritual Care Assessment/Progress Note  [facility]      NAME: Ruthie Duffy      MRN: [id number]  AGE: 62 y.o. SEX: female  Adventist Affiliation: Latter-day   Language: English     7/6/2022     Total Time (in minutes): 5     Spiritual Assessment begun in [address] through conversation with:         [x]Patient        [] Family    [] Friend(s)        Reason for Consult: [facility]     Spiritual beliefs: (Please include comment if needed)     [x] Identifies with a monty tradition:  Latter-day       [] Supported by a monty community:            [] Claims no spiritual orientation:           [] Seeking spiritual identity:                [] Adheres to an individual form of spirituality:           [] Not able to assess:                           Identified resources for coping:      [x] Prayer                               [x] Music                  [] Guided Imagery     [x] Family/friends                 [] Pet visits     [] Devotional reading                         [] Unknown     [] Other:                                               Interventions offered during this visit: (See comments for more details)    Patient Interventions: Affirmation of monty,Communion (Latter-day),Initial/Spiritual assessment, patient floor,Prayer (actual),Prayer (assurance of)           Plan of Care:     [x] Support spiritual and/or cultural needs    [] Support AMD and/or advance care planning process      [] Support grieving process   [] Coordinate Rites and/or Rituals    [] Coordination with community clergy   [] No spiritual needs identified at this time   [] Detailed Plan of Care below (See Comments)  [] Make referral to Music Therapy  [] Make referral to Pet Therapy     [] Make referral to Addiction services  [] Make referral to [facility]  [] Make referral to Spiritual Care Partner  [] No future visits requested        [] Contact Spiritual Care for further referrals     Comments: Mrs. Spencer was at her door when  arrived. She was getting ready to walk the halls and said she is waiting to go home today. Prayer and communion offered. Mrs. Spencer was constantly moving and admitted she is an active person.       Dixon Officer, RICKY, RN, ACSW, LCSW   Page:  [phone]

## 2022-07-06 NOTE — PROGRESS NOTES
OCCUPATIONAL THERAPY EVALUATION WITH DISCHARGE: Cervical spine  Patient: Blanca Christine (1 y.o. female)  Date: 2022  Primary Diagnosis: Cervical radiculopathy [M54.12]  Procedure(s) (LRB):  C4-C7 ANTERIOR CERVICAL DISCECTOMY AND FUSION (N/A) 1 Day Post-Op   Precautions:   Back,Fall    ASSESSMENT :  Based on the objective data described below, patient presents with Independent upper body ADLs, Independent lower body ADLs, and Independent functional mobility. Pt cleared for d/c once medically cleared by MD.  Pt reports no more burning/tingling in B UEs. The following are barriers to ADL independence while in acute care:   - Cognitive and/or behavioral: Intact  - Medical condition: precautions    - Other:       Discharge recommendations: None    Equipment recommendations for successful discharge (if) home: None     PLAN :  Recommendations and Planned Interventions: other: none       PLAN :  Further skilled acute occupational therapy services are not indicated at this time. SUBJECTIVE:   Patient stated Do you think I will go home today? Yoly Chakraborty    OBJECTIVE DATA SUMMARY:   HISTORY:   Past Medical History:   Diagnosis Date    Arthritis     Asthma     Cancer ([facility] [address]     SQUAMOUS CELL ON NOSE    Chronic pain     BACK    Hypertension     Liver disease     HEP C- HAD TREATMENT    Menopause     Psychiatric disorder     ANXIETY AND DEPRESSION     Past Surgical History:   Procedure Laterality Date    HX  SECTION      X2    HX OTHER SURGICAL      DOG BITES RIGHT ARM/HAND PLATES    HX WISDOM TEETH EXTRACTION         Prior Level of Function/Environment/Context: independent    Expanded or extensive additional review of patient history:     Home Situation  Home Environment: Private residence  # Steps to Enter: 0  One/Two Story Residence: One story  Living Alone: Yes  Support Systems: Other Family Member(s)  Patient Expects to be Discharged to[de-identified] Home  Current DME Used/Available at Home: None  Tub or Shower Type: Tub/Shower combination    Hand dominance: Left    EXAMINATION OF PERFORMANCE DEFICITS:  Cognitive/Behavioral Status:  Neurologic State: Alert  Orientation Level: Oriented X4  Cognition: Appropriate decision making        Safety/Judgement: Awareness of environment    Skin: intact    Edema: none noted    Hearing:       Vision/Perceptual:                                     Range of Motion:    AROM: Within functional limits                         Strength:    Strength: Generally decreased, functional                Coordination:  Coordination: Within functional limits  Fine Motor Skills-Upper: Left Intact; Right Intact    Gross Motor Skills-Upper: Left Intact; Right Intact    Tone & Sensation:    Tone: Normal  Sensation: Intact                      Balance:  Sitting: Intact  Standing: Intact    Functional Mobility and Transfers for ADLs:  Bed Mobility:  Supine to Sit: Modified independent; Additional time  Sit to Supine: Modified independent; Additional time    Transfers:  Sit to Stand: Independent  Stand to Sit: Independent  Bed to Chair: Independent  Bathroom Mobility: Independent  Toilet Transfer : Independent    ADL Assessment:  Feeding: Independent    Oral Facial Hygiene/Grooming: Independent    Bathing: Independent    Upper Body Dressing: Independent    Lower Body Dressing: Independent    Toileting: Independent                ADL Intervention and task modifications:       Cognitive Retraining  Safety/Judgement: Awareness of environment    Patient instructed and demonstrated 3/3 cervical spine precautions with verbal cues. Patient instructed and indicated understanding the benefits of maintaining activity tolerance, functional mobility, and independence with self care tasks during acute stay  to ensure safe return home and to baseline.  Encouraged patient to increase frequency and duration OOB, not sitting longer than 30 mins without marching/walking with staff, be out of bed for all meals, perform daily ADLs (as approved by RN/MD regarding bathing etc), and performing functional mobility to/from bathroom. Patient instruction and indicated understanding on body mechanics, ergonomics and gravitational force on the spine during different body positions to plan activities in prep for return home to complete basic ADLs, instrumental ADLs and back to work safely. Bathing: Patient instructed and indicated understanding when bathing to not submerge wound in water, stand to shower or sponge bathe, cover wound with plastic and tape to ensure no water reaches bandage/wound without cues. Dressing brace: Patient instructed and demonstrated while in front of mirror to don/doff velcro on brace using dominant side, keeping non-dominant side intact. Instruction and indicated understanding in removal of fabric pieces, placement of clean pieces, don brace, then can hand wash and allow air dry. Dressing lower body: Patient instructed to don brace first and on the benefits to remain seated to don all clothing to increase independence with precautions and pain management. Patient instructed and demonstrated tailor sitting for lower body dressing. Home safety: Patient instructed and indicated understanding on home modifications and safety [raise height of ADL objects (i.e. clothing, sink items, fridge items, items to mouth when grooming), change of floor surfaces, clear pathways] to increase independence and fall prevention. Tub transfer: Patient instructed and indicated understanding regarding when it is safe to begin transfer into tub (complete stairs with PT, advance exercises with PT high enough to clear tub height, and while clothes donned practice with another person present). Pain:  0/10p! Activity Tolerance:   WFLs  Please refer to the flowsheet for vital signs taken during this treatment.     After treatment patient left:   Supine in bed   COMMUNICATION/EDUCATION:   The patients evaluation was discussed with: Physical Therapist.    Thank you for this referral.  Saniya Pryor, OTR/L  Time Calculation: 12 mins

## 2022-07-06 NOTE — PROGRESS NOTES
I prayed with Bre and celebrated with her the Anointing of the [address]   Father Jaya Sosa

## 2022-07-06 NOTE — DISCHARGE INSTRUCTIONS
After Hospital Care Plan:  Discharge Instructions Cervical (Neck) Spine Surgery Dr. Ye Barker    Patient Name: Torri Martinez    Date of procedure: 7/5/2022      Procedure(s):  C4-C7 ANTERIOR CERVICAL DISCECTOMY AND FUSION       PCP: Machelle Peacock MD    Follow up appointments  -follow up with Dr. Ye Barker in 2 weeks. [phone] to make an appointment as soon as you get home from the hospital.    When to call your Spine Surgeon:  -Difficulty swallowing that is worse than when you left the hospital.  -Signs of infection-if your incision is red; continues to have drainage; drainage has a foul odor or if you have a persistent fever over 101 degrees for 24 hours  -nausea or vomiting, severe headache  -changes in sensation in your arms or legs (numbness, tingling, loss of color)  -increased weakness-greater than before your surgery  -severe pain or pain not relieved by medications  -Signs of a blood clot in your leg-calf pain, tenderness, redness, swelling of lower leg    When to call your Primary Care Physician:  -Concerns about medical conditions such as diabetes, high blood pressure, asthma, congestive heart failure  -Call if blood sugars are elevated, persistent headache or dizziness, coughing or congestion, constipation or diarrhea, burning with urination, abnormal heart rate    When to call 911 and go to the nearest emergency room:  -acute onset of chest pain, shortness of breath, difficulty breathing    Activity  - You are going home a well person, be as active as possible. Your only exercise should be walking. Start with short frequent walks and increase your walking distance each day.  -Limit the amount of time you sit to 20-30 minute intervals. Sitting for prolonged periods of time will be uncomfortable for you following surgery.   - Do NOT lift anything over 5 pounds  -From now on, even when lifting light weight, bend with your knees and not your back.  -Do NOT do any neck exercises until you have been instructed by your doctor  -When you are in bed, you may lay on your back or on either side. Do NOT lie on your stomach    Cervical Collar (Aspen Collar)  -You are required to wear your cervical collar at all times; except when showering. You may remove the collar long enough to change the pads when needed and to change your dressing each day. -Do not bend or twist when your collar is off. It is best to have someone assist you when changing the pads and your dressing to prevent you from bending your neck. - Clean the pads on your neck collar every day by hand washing with a mild soap and water. Pat them dry with a towel and lay out to air dry. Do not use heat to dry the pads. Diet  -Start out by having liquids and soft foods for 2-3 days. As you heal, slowly resume usual diet; drink plenty of fluids; eat foods high in fiber  -It is important to have regular bowel movements. Pain medications may cause constipation. You may want to take a stool softener (such as Senokot-S or Colace) to prevent constipation.  -If constipation occurs, take a laxative (such as Dulcolax tablets, Milk of Magnesia, or a suppository). Laxatives should only be used if the above preventable measures have failed and you still have not had a bowel movement after three days    Driving  -You may not drive or return to work until instructed by your physician. However, you may ride in the car for short periods of time. Incision Care  -You may take brief showers but do not run the water run directly onto the wound.  After showering or bathing gently blot the wound dry with a soft towel.  -Do not rub or apply any lotions or ointments to your incision site.   -Do not soak or scrub your wound; do not swim until the adhesive film has fallen off naturally  -Do not scratch, rub, or pick at the wound or skin glue, doing so may loosen the film before the wound is fully healed  -Stay out of direct sunlight and do not use tanning lamps     Showering  -You may shower in approximately 4 days after your surgery.    -Reminder- Make sure you put clean pads on your collar after your shower.    -Do not take a tub bath. Preventing blood clots  -You have been given T.E.D. stockings to wear. Continue to wear these for 7 days after your discharge. Put them on in the morning and take them off at night.    -They are used to increase your circulation and prevent blood clots from forming in your legs  -T. E.D. stockings can be machine washed, temperature not to exceed 160° F (71°C) and machine dried for 15 to 20 minutes, temperature not to exceed 250° F (121°C). Pain management  -Take pain medication as prescribed; decrease the amount you use as your pain lessens.  -Do not wait until you are in extreme pain to take your medication.  -Avoid alcoholic beverages while taking pain medication. Pain Medication Safety  DO:  -Read the Medication Guide   -Take your medicine exactly as prescribed   -Store your medicine away from children and in a safe place   -Flush unused medicine down the toilet   -Call your healthcare provider for medical advice about side effects. You may report side effects to FDA at 6-223-FDA-2493.   -Please be aware that many medications contain Tylenol. We do not want you to over medicate so please read the information below as a guide. Do not take more than 4 Grams of Tylenol in a 24 hour period.   (There are 1000 milligrams in one Gram)                                                                                                                                                                                                                                                                                                                                                                  Percocet contains 325 mg of Tylenol per tablet (do not take more than 12 tablets in 24 hours)  Lortab contains 500 mg of Tylenol per tablet (do not take more than 8 tablets in 24 hours)  Norco contains 325 mg of Tylenol per tablet (do not take more than 12 tablets in 24 hours). DO NOT:  -Do not give your medicine to others   -Do not take medicine unless it was prescribed for you   -Do not stop taking your medicine without talking to your healthcare provider   -Do not break, chew, crush, dissolve, or inject your medicine. If you cannot  swallow your medicine whole, talk to your healthcare provider.  -Do not drink alcohol while taking this medicine  -Do not take anti-inflammatory medications or aspirin unless instructed by your   physician.

## 2022-07-06 NOTE — PROGRESS NOTES
Problem: Falls - Risk of  Goal: *Absence of Falls  Description: Document Ashley Li Fall Risk and appropriate interventions in the flowsheet.   Outcome: Progressing Towards Goal  Note: Fall Risk Interventions:            Medication Interventions: Teach patient to arise slowly,Patient to call before getting OOB

## 2022-07-06 NOTE — ANESTHESIA POSTPROCEDURE EVALUATION
Procedure(s):  C4-C7 ANTERIOR CERVICAL DISCECTOMY AND FUSION. general    Anesthesia Post Evaluation        Patient location during evaluation: PACU  Note status: Adequate. Level of consciousness: responsive to verbal stimuli and sleepy but conscious  Pain management: satisfactory to patient  Airway patency: patent  Anesthetic complications: no  Cardiovascular status: acceptable  Respiratory status: acceptable  Hydration status: acceptable  Comments: +Post-Anesthesia Evaluation and Assessment    Patient: Lesa Wright MRN: [id number]  SSN: [id number]   YOB: 1965  Age: 62 y.o. Sex: female          Cardiovascular Function/Vital Signs    BP (!) 151/87 (BP 1 Location: Left upper arm, BP Patient Position: At rest;Lying)   Pulse 91   Temp 36.6 °C (97.9 °F)   Resp 16   Ht 5' 2\" (1.575 m)   Wt 67.9 kg (149 lb 11.1 oz)   SpO2 96%   BMI 27.38 kg/m²     Patient is status post Procedure(s):  C4-C7 ANTERIOR CERVICAL DISCECTOMY AND FUSION. Nausea/Vomiting: Controlled. Postoperative hydration reviewed and adequate. Pain:  Pain Scale 1: Numeric (0 - 10) (07/06/22 1410)  Pain Intensity 1: 4 (07/06/22 1410)   Managed. Neurological Status:   Neuro (WDL): Exceptions to WDL (07/05/22 1240)   At baseline. Mental Status and Level of Consciousness: Arousable. Pulmonary Status:   O2 Device: None (Room air) (07/06/22 0859)   Adequate oxygenation and airway patent. Complications related to anesthesia: None    Post-anesthesia assessment completed. No concerns. I have evaluated the patient and the patient is stable and ready to be discharged from PACU .     Signed By: Lissette Valadez MD    7/6/2022        INITIAL Post-op Vital signs:   Vitals Value Taken Time   /95 07/05/22 1330   Temp 36.2 °C (97.2 °F) 07/05/22 1240   Pulse 97 07/05/22 1345   Resp 14 07/05/22 1345   SpO2 100 % 07/05/22 1345

## 2022-07-06 NOTE — PROGRESS NOTES
Problem: Falls - Risk of  Goal: *Absence of Falls  Description: Document Naveen Ansari Fall Risk and appropriate interventions in the flowsheet. Outcome: Progressing Towards Goal  Note: Fall Risk Interventions:       Medication Interventions: Patient to call before getting OOB,Teach patient to arise slowly          Problem: Pressure Injury - Risk of  Goal: *Prevention of pressure injury  Description: Document Mahamed Scale and appropriate interventions in the flowsheet.   Outcome: Progressing Towards Goal  Note: Pressure Injury Interventions:  Sensory Interventions: Check visual cues for pain,Assess changes in LOC    Moisture Interventions: Moisture barrier,Minimize layers,Absorbent underpads    Activity Interventions: Increase time out of bed,Pressure redistribution bed/mattress(bed type),PT/OT evaluation    Mobility Interventions: Pressure redistribution bed/mattress (bed type),PT/OT evaluation    Nutrition Interventions: Offer support with meals,snacks and hydration

## 2022-07-07 NOTE — DISCHARGE SUMMARY
Spine Discharge Summary    Patient ID:  Simeon Dasilva  [id number]  female  62 y.o.  1965    Admit date: 7/5/2022    Discharge date: 7/6/22    Admitting Physician: Shanna Hernandez MD     Consulting Physician(s):   Treatment Team: Utilization Review: Tish Schilder, RN; Care Manager: Vic Kennedy    Date of Surgery:   7/5/2022     Preoperative Diagnosis:  CERVICAL RADICULOPATHY    Postoperative Diagnosis:   CERVICAL RADICULOPATHY    Procedure(s):  C4-C7 ANTERIOR CERVICAL DISCECTOMY AND FUSION     Anesthesia Type:   General     Surgeon: Guillermo Garza MD                            HPI:  Pt is a 62 y.o. female who has a history of CERVICAL RADICULOPATHY  with pain and limitations of activities of daily living who presents at this time for a C4-C7 ANTERIOR CERVICAL DISCECTOMY AND FUSION  following the failure of conservative management. PMH:   Past Medical History:   Diagnosis Date    Arthritis     Asthma     Cancer ([address]     SQUAMOUS CELL ON NOSE    Chronic pain     BACK    Hypertension     Liver disease     HEP C- HAD TREATMENT    Menopause     Psychiatric disorder     ANXIETY AND DEPRESSION       Body mass index is 27.38 kg/m². : A BMI > 30 is classified as obesity and > 40 is classified as morbid obesity. Medications upon admission :   Prior to Admission Medications   Prescriptions Last Dose Informant Patient Reported? Taking? OTHER 6/28/2022 at Unknown time  Yes No   Sig: VITAMINS AND SUPPLEMENTS   Patient not taking: Reported on 7/5/2022   buPROPion XL (WELLBUTRIN XL) 300 mg XL tablet 7/4/2022 at Unknown time  Yes Yes   Sig: Take 300 mg by mouth every morning. docusate sodium (Stool Softener) 100 mg tab 7/3/2022 at Unknown time  Yes No   Sig: Take  by mouth nightly. Patient not taking: Reported on 7/5/2022   gabapentin (NEURONTIN) 100 mg capsule 7/3/2022 at Unknown time  Yes No   Sig: Take 100 mg by mouth three (3) times daily.  PT ONLY TAKING AT HS   lisinopriL (PRINIVIL, ZESTRIL) 40 mg tablet 7/4/2022 at 11pm  Yes Yes   Sig: Take 40 mg by mouth nightly. PT REPORTS IF BP REMAINS HIGH SHE TAKES AN EXTRA 20 MG, NOT PER DR. Everette Hung   prazosin (MINIPRESS) 2 mg capsule Not Taking at Unknown time  Yes No   Sig: nightly as needed (NIGHT TERRORS). Patient not taking: Reported on 7/5/2022   tiZANidine (ZANAFLEX) 2 mg capsule 7/3/2022  Yes No   Sig: Take 2 mg by mouth every eight (8) hours as needed. PT ONLY TAKES AT HS      Facility-Administered Medications: None        Allergies: Allergies   Allergen Reactions    Latex Other (comments)     CONDOMS CAUSE SEVERE IRRITATION    Penicillins Hives     PT REPORTS SEVERE NON-IGE-MEDIATED REACTIONS        Hospital Course: The patient underwent surgery. Complications:  None; patient tolerated the procedure well. Was taken to the PACU in stable condition and then transferred to the ortho floor. Perioperative Antibiotics:  Cleocin    Postoperative Pain Management:  Oxycodone & Tylenol     Postoperative transfusions:    Number of units banked PRBCs =   none     Post Op complications: none    Hemoglobin at discharge:    Lab Results   Component Value Date/Time    HGB 14.6 06/29/2022 08:23 AM    INR 0.9 06/29/2022 08:23 AM       Dressing was changed on POD # 1. Incision - clean, dry and intact. No significant erythema or swelling. Wound appears to be healing without any evidence of infection. Pt had a HVAC drain that was removed on POD# 1. Neurovascular exam found to be within normal limits. Physical Therapy started following surgery and participated in bed mobility, transfers and ambulation. Gait:  Gait  Base of Support: Narrowed  Speed/Brenda: Slow  Ambulation - Level of Assistance: Independent  Distance (ft): 100 Feet (ft)  Assistive Device: Gait belt  Rail Use: Left   Stairs - Level of Assistance: Independent  Number of Stairs Trained: 12                   Discharged to: Home.     Condition on Discharge: good    Discharge instructions:  - Take pain medications as prescribed  - Resume pre hospital diet      - Discharge activity: activity as tolerated  - Ambulate as tolerated  - Avoid bending, lifting and twisting  - Cervical Collar  - Wound Care Keep wound clean and dry. See discharge instruction sheet. -DISCHARGE MEDICATION LIST     Discharge Medication List as of 7/6/2022  2:18 PM      CONTINUE these medications which have CHANGED    Details   gabapentin (NEURONTIN) 300 mg capsule Take 1 Capsule by mouth three (3) times daily for 7 days. Max Daily Amount: 900 mg. PT ONLY TAKING AT HS, Normal, Disp-21 Capsule, R-0      oxyCODONE-acetaminophen (PERCOCET) 5-325 mg per tablet Take 1 Tablet by mouth every four (4) hours as needed for Pain for up to 7 days. Max Daily Amount: 6 Tablets. Indications: pain, Normal, Disp-42 Tablet, R-0         CONTINUE these medications which have NOT CHANGED    Details   lisinopriL (PRINIVIL, ZESTRIL) 40 mg tablet Take 40 mg by mouth nightly. PT REPORTS IF BP REMAINS HIGH SHE TAKES AN EXTRA 20 MG, NOT PER DR. Sumaya Herr, Historical Med      buPROPion XL (WELLBUTRIN XL) 300 mg XL tablet Take 300 mg by mouth every morning., Historical Med      tiZANidine (ZANAFLEX) 2 mg capsule Take 2 mg by mouth every eight (8) hours as needed. PT ONLY TAKES AT HS, Historical Med      docusate sodium (Stool Softener) 100 mg tab Take  by mouth nightly., Historical Med      OTHER VITAMINS AND SUPPLEMENTS, Historical Med      prazosin (MINIPRESS) 2 mg capsule nightly as needed (NIGHT TERRORS). , Historical Med         STOP taking these medications       naltrexone microspheres (VIVITROL) 380 mg ER injection Comments:   Reason for Stopping:            per medical continuation form      -Follow up in office in 2 weeks      Signed:  SHA Loza     7/7/2022  3:14 PM

## 2022-11-21 NOTE — H&P
CARE TEAM:  Patient Care Team:  Carmie Skiff, FNP as PCP - General (Nurse Practitioner)    ASSESSMENT:  1. Mass of right hand   2. Bilateral carpal tunnel syndrome       Patient Active Problem List   Diagnosis   Asthma   Cervical radiculopathy   Chronic hepatitis C without hepatic coma   Cigarette smoker   Environmental and seasonal allergies   Essential hypertension   History of drug abuse in remission   Nightmare   SCC (squamous cell carcinoma), foot     PLAN:  Treatment Plan: I have recommended excisional biopsy. She is in agreement. This can be done under local anesthesia at the time of her carpal tunnel release. Discussed reasonable expectations. We discussed usual postoperative course. She has given informed consent to proceed. Plan is therefore for right endoscopic carpal tunnel release and right hand mass excision under local anesthesia. No orders of the defined types were placed in this encounter. Follow-up: Return for first postoperative visit. HISTORY OF PRESENT ILLNESS:  Chief Complaint: Mass of the Right Hand    Age: 62 y.o. Sex: female   History of present illness: Rambo Osorio is a pleasant 62 y.o. female who presents today for evaluation of her right hand. She is scheduled for right endoscopic carpal tunnel release under local anesthesia next week. She would like me to look at a mass on the dorsal aspect of the hand which is been present for several years. This causes some discomfort. Slowly enlarging with time    Past medical history, past surgical history, medications, allergies, social history, and review of systems have been reviewed by me. No current facility-administered medications on file prior to encounter. Current Outpatient Medications on File Prior to Encounter   Medication Sig Dispense Refill    lisinopriL (PRINIVIL, ZESTRIL) 40 mg tablet Take 40 mg by mouth nightly.  PT REPORTS IF BP REMAINS HIGH SHE TAKES AN EXTRA 20 MG, NOT PER DR. Oliver Jurado      tiDomitila (ZANAFLEX) 2 mg capsule Take 2 mg by mouth every eight (8) hours as needed. PT ONLY TAKES AT HS      docusate sodium (Stool Softener) 100 mg tab Take  by mouth nightly. (Patient not taking: Reported on 7/5/2022)      OTHER VITAMINS AND SUPPLEMENTS (Patient not taking: Reported on 7/5/2022)      buPROPion XL (WELLBUTRIN XL) 300 mg XL tablet Take 300 mg by mouth every morning. prazosin (MINIPRESS) 2 mg capsule nightly as needed (NIGHT TERRORS). (Patient not taking: Reported on 7/5/2022)       Past Medical History:   Diagnosis Date    Arthritis     Asthma     Cancer ([facility] [address]     SQUAMOUS CELL ON NOSE    Chronic pain     BACK    Hypertension     Liver disease     HEP C- HAD TREATMENT    Menopause     Psychiatric disorder     ANXIETY AND DEPRESSION       Allergies   Allergen Reactions    Latex Other (comments)     CONDOMS CAUSE SEVERE IRRITATION    Penicillins Hives     PT REPORTS SEVERE NON-IGE-MEDIATED REACTIONS       Social History     Socioeconomic History    Marital status:      Spouse name: Not on file    Number of children: Not on file    Years of education: Not on file    Highest education level: Not on file   Occupational History    Not on file   Tobacco Use    Smoking status: Every Day     Packs/day: 1.00     Years: 50.00     Pack years: 50.00     Types: Cigarettes    Smokeless tobacco: Never    Tobacco comments:     SMOKES 2 CIGS DAILY NOW   Vaping Use    Vaping Use: Never used   Substance and Sexual Activity    Alcohol use: Never    Drug use: Not on file     Comment: Heroin.   Last used 2018; MARIJUANA CURRENTLY    Sexual activity: Not on file   Other Topics Concern    Not on file   Social History Narrative    Not on file     Social Determinants of Health     Financial Resource Strain: Not on file   Food Insecurity: Not on file   Transportation Needs: Not on file   Physical Activity: Not on file   Stress: Not on file   Social Connections: Not on file   Intimate Partner Violence: Not on file   Housing Stability: Not on file         Review of Systems   11/17/2022    Constitutional: Unexplained: Negative  Genitourinary: Frequent Urination: Negative  HEENT: Vision Loss: Negative  Neurological: Memory Loss: Negative  Integumentary: Rash: Negative  Cardiovascular: Palpatations: Negative   Gastrointestinal: Constipation: Positive  Immunological: Seasonal Allergies: Positive  Musculoskeletal: Joint Pain: Positive     OBJECTIVE:  Constitutional: No acute distress. Pleasant and cooperative with exam.    Musculoskeletal     She has a firm soft tissue mass on the dorsal aspect of the hand in the area of the 1st [facility] joint which is tender. No fluctuance. No erythema. No warmth. Measures nearly 2 cm in diameter.     IMAGING / STUDIES:        No imaging obtained

## 2022-11-21 NOTE — H&P (VIEW-ONLY)
CARE TEAM:  Patient Care Team:  HECTOR Wilson as PCP - General (Nurse Practitioner)    ASSESSMENT:  1. Mass of right hand   2. Bilateral carpal tunnel syndrome       Patient Active Problem List   Diagnosis   Asthma   Cervical radiculopathy   Chronic hepatitis C without hepatic coma   Cigarette smoker   Environmental and seasonal allergies   Essential hypertension   History of drug abuse in remission   Nightmare   SCC (squamous cell carcinoma), foot     PLAN:  Treatment Plan: I have recommended excisional biopsy. She is in agreement. This can be done under local anesthesia at the time of her carpal tunnel release. Discussed reasonable expectations. We discussed usual postoperative course. She has given informed consent to proceed. Plan is therefore for right endoscopic carpal tunnel release and right hand mass excision under local anesthesia. No orders of the defined types were placed in this encounter. Follow-up: Return for first postoperative visit. HISTORY OF PRESENT ILLNESS:  Chief Complaint: Mass of the Right Hand    Age: 62 y.o. Sex: female   History of present illness: Cyrena Schirmer is a pleasant 62 y.o. female who presents today for evaluation of her right hand. She is scheduled for right endoscopic carpal tunnel release under local anesthesia next week. She would like me to look at a mass on the dorsal aspect of the hand which is been present for several years. This causes some discomfort. Slowly enlarging with time    Past medical history, past surgical history, medications, allergies, social history, and review of systems have been reviewed by me. No current facility-administered medications on file prior to encounter. Current Outpatient Medications on File Prior to Encounter   Medication Sig Dispense Refill    lisinopriL (PRINIVIL, ZESTRIL) 40 mg tablet Take 40 mg by mouth nightly.  PT REPORTS IF BP REMAINS HIGH SHE TAKES AN EXTRA 20 MG, NOT PER DR. Sally Infante      tiDomitila (ZANAFLEX) 2 mg capsule Take 2 mg by mouth every eight (8) hours as needed. PT ONLY TAKES AT HS      docusate sodium (Stool Softener) 100 mg tab Take  by mouth nightly. (Patient not taking: Reported on 7/5/2022)      OTHER VITAMINS AND SUPPLEMENTS (Patient not taking: Reported on 7/5/2022)      buPROPion XL (WELLBUTRIN XL) 300 mg XL tablet Take 300 mg by mouth every morning. prazosin (MINIPRESS) 2 mg capsule nightly as needed (NIGHT TERRORS). (Patient not taking: Reported on 7/5/2022)       Past Medical History:   Diagnosis Date    Arthritis     Asthma     Cancer ([facility] [address]     SQUAMOUS CELL ON NOSE    Chronic pain     BACK    Hypertension     Liver disease     HEP C- HAD TREATMENT    Menopause     Psychiatric disorder     ANXIETY AND DEPRESSION       Allergies   Allergen Reactions    Latex Other (comments)     CONDOMS CAUSE SEVERE IRRITATION    Penicillins Hives     PT REPORTS SEVERE NON-IGE-MEDIATED REACTIONS       Social History     Socioeconomic History    Marital status:      Spouse name: Not on file    Number of children: Not on file    Years of education: Not on file    Highest education level: Not on file   Occupational History    Not on file   Tobacco Use    Smoking status: Every Day     Packs/day: 1.00     Years: 50.00     Pack years: 50.00     Types: Cigarettes    Smokeless tobacco: Never    Tobacco comments:     SMOKES 2 CIGS DAILY NOW   Vaping Use    Vaping Use: Never used   Substance and Sexual Activity    Alcohol use: Never    Drug use: Not on file     Comment: Heroin.   Last used 2018; MARIJUANA CURRENTLY    Sexual activity: Not on file   Other Topics Concern    Not on file   Social History Narrative    Not on file     Social Determinants of Health     Financial Resource Strain: Not on file   Food Insecurity: Not on file   Transportation Needs: Not on file   Physical Activity: Not on file   Stress: Not on file   Social Connections: Not on file   Intimate Partner Violence: Not on file   Housing Stability: Not on file         Review of Systems   11/17/2022    Constitutional: Unexplained: Negative  Genitourinary: Frequent Urination: Negative  HEENT: Vision Loss: Negative  Neurological: Memory Loss: Negative  Integumentary: Rash: Negative  Cardiovascular: Palpatations: Negative   Gastrointestinal: Constipation: Positive  Immunological: Seasonal Allergies: Positive  Musculoskeletal: Joint Pain: Positive     OBJECTIVE:  Constitutional: No acute distress. Pleasant and cooperative with exam.    Musculoskeletal     She has a firm soft tissue mass on the dorsal aspect of the hand in the area of the [address]st [address] joint which is tender. No fluctuance. No erythema. No warmth. Measures nearly 2 cm in diameter.     IMAGING / STUDIES:        No imaging obtained

## 2022-11-22 ENCOUNTER — HOSPITAL ENCOUNTER (OUTPATIENT)
Age: 57
Setting detail: OUTPATIENT SURGERY
Discharge: HOME | End: 2022-11-22
Attending: ORTHOPAEDIC SURGERY | Admitting: ORTHOPAEDIC SURGERY
Payer: MEDICAID

## 2022-11-22 VITALS
TEMPERATURE: 97.8 F | WEIGHT: 145.5 LBS | HEART RATE: 60 BPM | BODY MASS INDEX: 26.78 KG/M2 | SYSTOLIC BLOOD PRESSURE: 163 MMHG | HEIGHT: 62 IN | DIASTOLIC BLOOD PRESSURE: 95 MMHG | OXYGEN SATURATION: 99 % | RESPIRATION RATE: 20 BRPM

## 2022-11-22 PROCEDURE — 88305 TISSUE EXAM BY PATHOLOGIST: CPT

## 2022-11-22 PROCEDURE — 77030020754 HC CUF TRNQT 2BLA STRY -B: Performed by: ORTHOPAEDIC SURGERY

## 2022-11-22 PROCEDURE — 74011000250 HC RX REV CODE- 250: Performed by: ORTHOPAEDIC SURGERY

## 2022-11-22 PROCEDURE — 77030040356 HC CORD BPLR FRCP COVD -A: Performed by: ORTHOPAEDIC SURGERY

## 2022-11-22 PROCEDURE — 77030002916 HC SUT ETHLN J&J -A: Performed by: ORTHOPAEDIC SURGERY

## 2022-11-22 PROCEDURE — 2709999900 HC NON-CHARGEABLE SUPPLY: Performed by: ORTHOPAEDIC SURGERY

## 2022-11-22 PROCEDURE — 77030006602 HC BLD CRPL AGEE MCRA -C: Performed by: ORTHOPAEDIC SURGERY

## 2022-11-22 PROCEDURE — 76030000002 HC AMB SURG OR TIME FIRST 0.: Performed by: ORTHOPAEDIC SURGERY

## 2022-11-22 PROCEDURE — 76210000046 HC AMBSU PH II REC FIRST 0.5 HR: Performed by: ORTHOPAEDIC SURGERY

## 2022-11-22 PROCEDURE — 77030040361 HC SLV COMPR DVT MDII -B: Performed by: ORTHOPAEDIC SURGERY

## 2022-11-22 RX ORDER — GABAPENTIN 300 MG/1
300 CAPSULE ORAL 3 TIMES DAILY
COMMUNITY

## 2022-11-22 RX ORDER — IBUPROFEN 800 MG/1
800 TABLET ORAL
Qty: 30 TABLET | Refills: 0 | Status: SHIPPED | OUTPATIENT
Start: 2022-11-22

## 2022-11-22 NOTE — DISCHARGE INSTRUCTIONS
Dr. Tiffanie Barrios Postoperative Instructions    Please maintain the dressing placed at surgery for 5 days. You may remove it in 5 days. Please keep the dressing clean and dry for 5 days. In 5 days you may get the wound wet and then cover it with a bandaid. If you have any questions or problems with your dressing, please call our office at [phone]. Please elevate the operative extremity to the level of the heart to keep swelling at a minimum. You may get up to move around, but when seated please keep the extremity elevated as much as possible. This will decrease swelling and postoperative pain. You may do activities as tolerated. You may ice your arm/hand 5 times a day for 20 minutes at a time. A prescription for pain medication has been sent to your pharmacy. Take it as needed. You may also use over the counter medications for pain. Signs and symptoms of infection include: redness, increased pain, increased swelling not relieved by elevation, drainage, fever, or chills, If you develop any of these symptoms, call the office at [phone]. Please Follow-up at my office 14 days after surgery or when specified at your pre-operative appointment.

## 2022-11-22 NOTE — INTERVAL H&P NOTE
Update History & Physical    The Patient's History and Physical was reviewed with the patient and I examined the patient. There was no change. The surgical site was confirmed by the patient and me. Plan:  The risk, benefits, expected outcome, and alternative to the recommended procedure have been discussed with the patient. Patient understands and wants to proceed with the procedure.     Electronically signed by Yosef Peña MD on 11/22/2022 at 10:59 AM

## 2022-11-22 NOTE — OP NOTES
PREOPERATIVE DIAGNOSIS:   Right carpal tunnel syndrome. Right hand mass     POSTOPERATIVE DIAGNOSIS:   Right carpal tunnel syndrome. Right hand mass    PROCEDURES PERFORMED:   Endoscopic right carpal tunnel release. 2.   Right hand mass excisional biopsy    SURGEON: Rodolfo Issa MD     ASSISTANT: none    ANESTHESIA: General.     ESTIMATED BLOOD LOSS: Minimal.     SPECIMENS REMOVED: Mass right hand. COMPLICATIONS: None. IMPLANTS: None. INDICATIONS FOR PROCEDURE: This is a 62year-old female with   electrodiagnostic and clinical evidence of carpal tunnel syndrome. She has failed   conservative treatment, and wishes to proceed with endoscopic carpal tunnel   release. She also has a mass on the dorsal hand. She has requested excisional biopsy. After discussion of the risks, benefits, potential complications   and alternatives to surgery, she has elected to proceed. DESCRIPTION OF PROCEDURE: The patient was identified in the preoperative   holding area. Informed consent was obtained, and the operative sites were   marked. The planned surgical sites were anesthetized. The patient was then transferred to the OR, and placed supine on the   operating table. The right upper   extremity was sterilely prepped and draped in the usual fashion. A surgical   time-out was held, and the operative site was confirmed. Preoperative   antibiotics were not given. After Esmarch exsanguination, the tourniquet was   elevated to 250 mmHg. A Transverse incision was made over the mass. Large skin flaps were raised. The mass was easily identified and excised. This was sent to pathology. The wound was irrigated and closed with 4-0 nylon suture. A transverse incision was made just proximal to the   wrist flexion crease. Dissection was carried down through skin and   subcutaneous tissues, controlling bleeding with electrocautery. The   antebrachial fascia was incised sharply with a 15-blade.  The proximal aspect of the antebrachial fascia was then divided under direct   visualization. The carpal canal was entered - first with a synovial rasp,   and then serial dilators. The endoscope was placed. Good visualization of   the transverse carpal ligament was easily obtained. The transverse carpal   ligament was then sharply divided, starting distally and working   proximally. The 50% distal aspect of the ligament was transected. The   endoscope was then placed back into the carpal canal. Good release was   evident. The remaining 50% was then transected with the endoscope. The   endoscope was removed. The wound was thoroughly irrigated. The wound was then closed   with 4-0 nylon sutures. Sterile dressings were applied. The tourniquet was let down and brisk cap refill returned to the digits. She tolerated the entire procedure well without complications.

## 2022-12-14 ENCOUNTER — HOSPITAL ENCOUNTER (OUTPATIENT)
Age: 57
Setting detail: OUTPATIENT SURGERY
Discharge: HOME | End: 2022-12-14
Attending: ORTHOPAEDIC SURGERY | Admitting: ORTHOPAEDIC SURGERY
Payer: MEDICAID

## 2022-12-14 VITALS
RESPIRATION RATE: 18 BRPM | WEIGHT: 145 LBS | SYSTOLIC BLOOD PRESSURE: 176 MMHG | BODY MASS INDEX: 26.68 KG/M2 | DIASTOLIC BLOOD PRESSURE: 95 MMHG | OXYGEN SATURATION: 98 % | HEART RATE: 61 BPM | HEIGHT: 62 IN | TEMPERATURE: 97.8 F

## 2022-12-14 PROCEDURE — 77030002916 HC SUT ETHLN J&J -A: Performed by: ORTHOPAEDIC SURGERY

## 2022-12-14 PROCEDURE — 2709999900 HC NON-CHARGEABLE SUPPLY: Performed by: ORTHOPAEDIC SURGERY

## 2022-12-14 PROCEDURE — 76210000046 HC AMBSU PH II REC FIRST 0.5 HR: Performed by: ORTHOPAEDIC SURGERY

## 2022-12-14 PROCEDURE — 77030006602 HC BLD CRPL AGEE MCRA -C: Performed by: ORTHOPAEDIC SURGERY

## 2022-12-14 PROCEDURE — 77030040356 HC CORD BPLR FRCP COVD -A: Performed by: ORTHOPAEDIC SURGERY

## 2022-12-14 PROCEDURE — 74011000250 HC RX REV CODE- 250: Performed by: ORTHOPAEDIC SURGERY

## 2022-12-14 PROCEDURE — 76030000002 HC AMB SURG OR TIME FIRST 0.: Performed by: ORTHOPAEDIC SURGERY

## 2022-12-14 PROCEDURE — 77030020754 HC CUF TRNQT 2BLA STRY -B: Performed by: ORTHOPAEDIC SURGERY

## 2022-12-14 RX ORDER — IBUPROFEN 800 MG/1
800 TABLET ORAL
Qty: 30 TABLET | Refills: 0 | Status: SHIPPED | OUTPATIENT
Start: 2022-12-14

## 2022-12-14 NOTE — OP NOTES
PREOPERATIVE DIAGNOSIS: Left carpal tunnel syndrome. POSTOPERATIVE DIAGNOSIS: Left carpal tunnel syndrome. PROCEDURES PERFORMED: Endoscopic left carpal tunnel release. SURGEON: April Herman. Oscar Whaley MD     ASSISTANT: none    ANESTHESIA: local.     ESTIMATED BLOOD LOSS: Minimal.     SPECIMENS REMOVED: None. COMPLICATIONS: None. IMPLANTS: None. INDICATIONS FOR PROCEDURE: This is an 62year-old female with   electrodiagnostic and clinical evidence of carpal tunnel syndrome. She has failed   conservative treatment, and wishes to proceed with endoscopic carpal tunnel   release. After discussion of the risks, benefits, potential complications   and alternatives to surgery, she has elected to proceed. DESCRIPTION OF PROCEDURE: The patient was identified in the preoperative   holding area. Informed consent was obtained, and the operative site was   marked. The planned surgical site was anesthetized. The patient was then transferred to the OR, and placed supine on the   operating table. The left upper   extremity was sterilely prepped and draped in the usual fashion. A surgical   time-out was held, and the operative site was confirmed. Preoperative   antibiotics were not given. After Esmarch exsanguination, the tourniquet was   elevated to 250 mmHg. A transverse incision was made just proximal to the   wrist flexion crease. Dissection was carried down through skin and   subcutaneous tissues, controlling bleeding with electrocautery. The   antebrachial fascia was incised sharply with a 15-blade. The proximal   aspect of the antebrachial fascia was then divided under direct   visualization. The carpal canal was entered - first with a synovial rasp,   and then serial dilators. The endoscope was placed. Good visualization of   the transverse carpal ligament was easily obtained. The transverse carpal   ligament was then sharply divided, starting distally and working   proximally.  The 50% distal aspect of the ligament was transected. The   endoscope was then placed back into the carpal canal. Good release was   evident. The remaining 50% was then transected with the endoscope. The   endoscope was removed. The wound was thoroughly irrigated. . The wound was then closed   with 4-0 nylon sutures. Sterile dressings were applied. The tourniquet was let down and brisk cap refill returned to the digits. she tolerated the entire procedure well without complications.

## 2022-12-14 NOTE — ROUTINE PROCESS
Patient: Davis Parker MRN: [id number]  SSN: [id number]   YOB: 1965  Age: 62 y.o. Sex: female     Patient is status post Procedure(s):  LEFT ENDOSCOPIC CARPAL TUNNEL RELEASE.     Surgeon(s) and Role:     * Shakir Lord MD - Primary    Local/Dose/Irrigation:  SEE MAR                                         Dressing/Packing:  Incision 12/14/22 Hand Left-Dressing/Treatment: ABD pad;Cast padding;Gauze dressing/dressing sponge;Xeroform (12/14/22 0807)    Splint/Cast:  ]    Other:

## 2022-12-14 NOTE — DISCHARGE INSTRUCTIONS
Dr. Chandler Rodgers Postoperative Instructions    Please maintain the dressing placed at surgery for 5 days. You may remove it in 5 days. Please keep the dressing clean and dry for 5 days. In 5 days you may get the wound wet and then cover it with a bandaid. If you have any questions or problems with your dressing, please call our office at [phone]. Please elevate the operative extremity to the level of the heart to keep swelling at a minimum. You may get up to move around, but when seated please keep the extremity elevated as much as possible. This will decrease swelling and postoperative pain. You may do activities as tolerated. You may ice your arm/hand 5 times a day for 20 minutes at a time. A prescription for pain medication has been sent to your pharmacy. Take it as needed. You may also use over the counter medications for pain. Signs and symptoms of infection include: redness, increased pain, increased swelling not relieved by elevation, drainage, fever, or chills, If you develop any of these symptoms, call the office at [phone]. Please Follow-up at my office 14 days after surgery or when specified at your pre-operative appointment.

## 2022-12-14 NOTE — INTERVAL H&P NOTE
Update History & Physical    The Patient's History and Physical  was reviewed with the patient and I examined the patient. Patient now presents for surgery on the left side. The surgical site was confirmed by the patient and me. Plan:  The risk, benefits, expected outcome, and alternative to the recommended procedure have been discussed with the patient. Patient understands and wants to proceed with the procedure.     Electronically signed by Renetta White MD on 12/14/2022 at 7:26 AM

## 2022-12-14 NOTE — PERIOP NOTES
Reviewed discharge instructions with patient. Paper copy given to patient. No further questions at this time.

## 2023-05-20 RX ORDER — ASPIRIN 81 MG
TABLET, DELAYED RELEASE (ENTERIC COATED) ORAL
COMMUNITY

## 2023-05-20 RX ORDER — LISINOPRIL 40 MG/1
40 TABLET ORAL
COMMUNITY

## 2023-05-20 RX ORDER — GABAPENTIN 300 MG/1
300 CAPSULE ORAL 3 TIMES DAILY
COMMUNITY

## 2023-05-20 RX ORDER — TIZANIDINE HYDROCHLORIDE 2 MG/1
2 CAPSULE, GELATIN COATED ORAL EVERY 8 HOURS PRN
COMMUNITY

## 2023-05-20 RX ORDER — IBUPROFEN 800 MG/1
800 TABLET ORAL EVERY 8 HOURS PRN
COMMUNITY
Start: 2022-12-14

## 2023-05-20 RX ORDER — BUPROPION HYDROCHLORIDE 300 MG/1
300 TABLET ORAL EVERY MORNING
COMMUNITY

## 2024-01-08 ENCOUNTER — HOSPITAL ENCOUNTER (OUTPATIENT)
Facility: HOSPITAL | Age: 59
Discharge: HOME | End: 2024-01-11
Attending: INTERNAL MEDICINE
Payer: COMMERCIAL

## 2024-01-08 VITALS — WEIGHT: 135 LBS | BODY MASS INDEX: 24.84 KG/M2 | HEIGHT: 62 IN

## 2024-01-08 DIAGNOSIS — F17.200 TOBACCO USE DISORDER: ICD-10-CM

## 2024-01-08 DIAGNOSIS — Z12.31 SCREENING MAMMOGRAM FOR HIGH-RISK PATIENT: ICD-10-CM

## 2024-01-08 DIAGNOSIS — R05.3 CHRONIC COUGH: ICD-10-CM

## 2024-01-08 PROCEDURE — 77067 SCR MAMMO BI INCL CAD: CPT

## 2024-01-08 PROCEDURE — 94729 DIFFUSING CAPACITY: CPT

## 2024-01-08 PROCEDURE — 94010 BREATHING CAPACITY TEST: CPT

## 2024-01-08 PROCEDURE — 94060 EVALUATION OF WHEEZING: CPT

## 2024-01-08 PROCEDURE — 94726 PLETHYSMOGRAPHY LUNG VOLUMES: CPT

## 2024-01-08 PROCEDURE — 71271 CT THORAX LUNG CANCER SCR C-: CPT

## (undated) DEVICE — BIPOLAR FORCEPS CORD: Brand: VALLEYLAB

## (undated) DEVICE — 4.0MM NEURO (MATCH HEAD)

## (undated) DEVICE — PADDING CAST W4INXL4YD ST COT RAYON MICROPLEATED HIGHLY

## (undated) DEVICE — FLOSEAL HEMOSTATIC MATRIX, 10ML: Brand: FLOSEAL HEMOSTATIC MATRIX

## (undated) DEVICE — BANDAGE,ELASTIC,ESMARK,STERILE,4"X9',LF: Brand: MEDLINE

## (undated) DEVICE — SCREW EXT FIX L14MM FOR DISTRCTN

## (undated) DEVICE — MINOR BASIN -SMH: Brand: MEDLINE INDUSTRIES, INC.

## (undated) DEVICE — DRAPE,HAND,STERILE: Brand: MEDLINE

## (undated) DEVICE — COLLAR CERV AD REG CUSH FLX TAB OCCIPITAL SUPP STRP

## (undated) DEVICE — DRAPE,REIN 53X77,STERILE: Brand: MEDLINE

## (undated) DEVICE — DRESSING,GAUZE,XEROFORM,CURAD,1"X8",ST: Brand: CURAD

## (undated) DEVICE — DRAIN SURG 7FR END PERF 1/8IN SIL RND SMOOTH HEAT POLISHED

## (undated) DEVICE — SURGICAL PROCEDURE PACK ORTH IV ECLIPSE STRL

## (undated) DEVICE — SOLUTION IRRIG 1000ML 0.9% SOD CHL USP POUR PLAS BTL

## (undated) DEVICE — DISPOSABLE TOURNIQUET CUFF SINGLE BLADDER, DUAL PORT AND QUICK CONNECT CONNECTOR: Brand: COLOR CUFF

## (undated) DEVICE — C-ARM: Brand: UNBRANDED

## (undated) DEVICE — BNDG ELAS HK LOOP 3X5YD NS -- MATRIX

## (undated) DEVICE — HYPODERMIC SAFETY NEEDLE: Brand: MONOJECT

## (undated) DEVICE — HANDLE LT SNAP ON ULT DURABLE LENS FOR TRUMPF ALC DISPOSABLE

## (undated) DEVICE — PADDING CST 4IN STERILE --

## (undated) DEVICE — SUT ETHLN 4-0 18IN PS2 BLK --

## (undated) DEVICE — SPONGE GZ W4XL4IN COT 12 PLY TYP VII WVN C FLD DSGN

## (undated) DEVICE — GLOVE SURG SZ 75 L12IN FNGR THK94MIL STD WHT LTX FREE

## (undated) DEVICE — SUTURE VCRL 2-0 L27IN ABSRB UD CP-2 L26MM 1/2 CIR REV CUT J869H

## (undated) DEVICE — PREP SKN DURAPREP 26ML APPL --

## (undated) DEVICE — ANTERIOR CERVICAL-SMH: Brand: MEDLINE INDUSTRIES, INC.

## (undated) DEVICE — 3M™ TEGADERM™ TRANSPARENT FILM DRESSING FRAME STYLE, 1626W, 4 IN X 4-3/4 IN (10 CM X 12 CM), 50/CT 4CT/CASE: Brand: 3M™ TEGADERM™

## (undated) DEVICE — RESERVOIR,SUCTION,100CC,SILICONE: Brand: MEDLINE

## (undated) DEVICE — CORD,CAUTERY,BIPOLAR,STERILE: Brand: MEDLINE

## (undated) DEVICE — PREP SKN CHLRAPRP APL 26ML STR --

## (undated) DEVICE — SYR 10ML LUER LOK 1/5ML GRAD --

## (undated) DEVICE — SUTURE MCRYL SZ 4-0 L27IN ABSRB UD L24MM PS-1 3/8 CIR PRIM Y935H

## (undated) DEVICE — TUBING, SUCTION, 1/4" X 12', STRAIGHT: Brand: MEDLINE

## (undated) DEVICE — STANDARD (U) BLADE ASSEMBLY 1PK: Brand: MICROAIRE®

## (undated) DEVICE — DRAPE MICSCP W46XL120IN POLY DRAWSTRAP W STEREO OBS TB AND

## (undated) DEVICE — KIT,ANTI FOG,W/SPONGE & FLUID,SOFT PACK: Brand: MEDLINE

## (undated) DEVICE — COVER,TABLE,HEAVY DUTY,60"X90",STRL: Brand: MEDLINE

## (undated) DEVICE — 1010 S-DRAPE TOWEL DRAPE 10/BX: Brand: STERI-DRAPE™

## (undated) DEVICE — GARMENT,MEDLINE,DVT,INT,CALF,MED, GEN2: Brand: MEDLINE

## (undated) DEVICE — BONE WAX WHITE: Brand: BONE WAX WHITE

## (undated) DEVICE — ADHESIVE SKIN CLOSURE TOP 36 CC HI VISC DERMBND MINI

## (undated) DEVICE — TAPE,CLOTH/SILK,CURAD,3"X10YD,LF,40/CS: Brand: CURAD

## (undated) DEVICE — 2.0MM NEURO (MATCH HEAD) SOFT TOUCH

## (undated) DEVICE — GLOVE SURG SZ 8 L12IN FNGR THK79MIL GRN LTX FREE